# Patient Record
Sex: FEMALE | Race: WHITE | NOT HISPANIC OR LATINO | Employment: PART TIME | URBAN - METROPOLITAN AREA
[De-identification: names, ages, dates, MRNs, and addresses within clinical notes are randomized per-mention and may not be internally consistent; named-entity substitution may affect disease eponyms.]

---

## 2017-04-04 ENCOUNTER — TRANSCRIBE ORDERS (OUTPATIENT)
Dept: RADIOLOGY | Facility: HOSPITAL | Age: 32
End: 2017-04-04

## 2017-04-04 ENCOUNTER — HOSPITAL ENCOUNTER (OUTPATIENT)
Dept: RADIOLOGY | Facility: HOSPITAL | Age: 32
Discharge: HOME | End: 2017-04-04
Attending: INTERNAL MEDICINE
Payer: COMMERCIAL

## 2017-04-04 DIAGNOSIS — M79.7 RHEUMATISM, UNSPECIFIED AND FIBROSITIS: Primary | ICD-10-CM

## 2017-04-04 DIAGNOSIS — M79.0 RHEUMATISM, UNSPECIFIED AND FIBROSITIS: ICD-10-CM

## 2017-04-04 DIAGNOSIS — M79.7 RHEUMATISM, UNSPECIFIED AND FIBROSITIS: ICD-10-CM

## 2017-04-04 DIAGNOSIS — M79.0 RHEUMATISM, UNSPECIFIED AND FIBROSITIS: Primary | ICD-10-CM

## 2017-04-04 PROCEDURE — 73630 X-RAY EXAM OF FOOT: CPT

## 2018-07-25 RX ORDER — TRANEXAMIC ACID 650 1/1
TABLET ORAL
COMMUNITY
Start: 2018-05-15 | End: 2020-03-30 | Stop reason: ALTCHOICE

## 2018-07-27 ENCOUNTER — OFFICE VISIT (OUTPATIENT)
Dept: FAMILY MEDICINE CLINIC | Facility: CLINIC | Age: 33
End: 2018-07-27
Payer: COMMERCIAL

## 2018-07-27 VITALS
SYSTOLIC BLOOD PRESSURE: 118 MMHG | DIASTOLIC BLOOD PRESSURE: 78 MMHG | HEART RATE: 60 BPM | WEIGHT: 135 LBS | HEIGHT: 68 IN | BODY MASS INDEX: 20.46 KG/M2 | RESPIRATION RATE: 16 BRPM

## 2018-07-27 DIAGNOSIS — F41.9 ANXIETY: ICD-10-CM

## 2018-07-27 DIAGNOSIS — R53.83 FATIGUE, UNSPECIFIED TYPE: Primary | ICD-10-CM

## 2018-07-27 PROCEDURE — 99204 OFFICE O/P NEW MOD 45 MIN: CPT | Performed by: NURSE PRACTITIONER

## 2018-07-27 RX ORDER — BUSPIRONE HYDROCHLORIDE 15 MG/1
15 TABLET ORAL 3 TIMES DAILY
Qty: 60 TABLET | Refills: 5 | Status: SHIPPED | OUTPATIENT
Start: 2018-07-27 | End: 2018-09-17 | Stop reason: ALTCHOICE

## 2018-07-31 ENCOUNTER — TELEPHONE (OUTPATIENT)
Dept: FAMILY MEDICINE CLINIC | Facility: CLINIC | Age: 33
End: 2018-07-31

## 2018-07-31 NOTE — TELEPHONE ENCOUNTER
----- Message from Evelin Lazaro Dr sent at 7/31/2018  3:37 PM EDT -----  Call pt: all l abs are normal

## 2018-09-05 ENCOUNTER — TELEPHONE (OUTPATIENT)
Dept: FAMILY MEDICINE CLINIC | Facility: CLINIC | Age: 33
End: 2018-09-05

## 2018-09-05 NOTE — TELEPHONE ENCOUNTER
STAFF  ASSESSMENT IN YOUR FOLDER TO FILL OUT  she NEEDS A PPD AS WELL   SHE HAS NO INSURANCE, SO WE HAVE TO SEE WHAT THE NEW PRICING WILL BE,  CALL PATIENT WHEN FORM IS COMPLETE      [phone]

## 2018-09-17 ENCOUNTER — OFFICE VISIT (OUTPATIENT)
Dept: FAMILY MEDICINE CLINIC | Facility: CLINIC | Age: 33
End: 2018-09-17

## 2018-09-17 VITALS
HEART RATE: 84 BPM | DIASTOLIC BLOOD PRESSURE: 70 MMHG | SYSTOLIC BLOOD PRESSURE: 122 MMHG | BODY MASS INDEX: 20.72 KG/M2 | RESPIRATION RATE: 14 BRPM | WEIGHT: 136.7 LBS | HEIGHT: 68 IN

## 2018-09-17 DIAGNOSIS — Z00.00 PE (PHYSICAL EXAM), ANNUAL: Primary | ICD-10-CM

## 2018-09-17 DIAGNOSIS — Z11.1 SCREENING FOR TUBERCULOSIS: ICD-10-CM

## 2018-09-17 DIAGNOSIS — Z11.1 SCREENING EXAMINATION FOR PULMONARY TUBERCULOSIS: ICD-10-CM

## 2018-09-17 PROCEDURE — 99499 UNLISTED E&M SERVICE: CPT | Performed by: NURSE PRACTITIONER

## 2018-09-17 PROCEDURE — 86580 TB INTRADERMAL TEST: CPT

## 2018-09-17 NOTE — PROGRESS NOTES
Elzbieta Ramos is a 27 y o   female and is here for routine health maintenance  The patient reports no problems  Cancer Screening  Colononoscopy not indicated  Mammogram not indicated  Pap due in 2020  Abnormal pap? no        Immunization History   Administered Date(s) Administered    Td (adult), Unspecified 01/01/2001    Tdap 02/20/2014           Dentist Visit  within 6-12 months      The following portions of the patient's history were reviewed and updated as appropriate: allergies, current medications, past family history, past medical history, past social history, past surgical history and problem list       Review of Systems  Review of Systems   Constitutional: Negative  HENT: Negative  Eyes: Negative  Respiratory: Negative  Cardiovascular: Negative  Gastrointestinal: Negative  Endocrine: Negative  Genitourinary: Negative  Musculoskeletal: Negative  Skin: Negative  Allergic/Immunologic: Negative  Neurological: Negative  Hematological: Negative  Psychiatric/Behavioral: Negative  Objective   Vitals:    09/17/18 1318   BP: 122/70   Pulse: 84   Resp: 14     Wt Readings from Last 3 Encounters:   09/17/18 62 kg (136 lb 11 2 oz)   07/27/18 61 2 kg (135 lb)     BP Readings from Last 3 Encounters:   09/17/18 122/70   07/27/18 118/78     Pulse Readings from Last 3 Encounters:   09/17/18 84   07/27/18 60     BMI Readings from Last 3 Encounters:   09/17/18 21 09 kg/m²   07/27/18 20 83 kg/m²     Physical Exam   Constitutional: She is oriented to person, place, and time  She appears well-developed and well-nourished  HENT:   Head: Normocephalic  Eyes: Pupils are equal, round, and reactive to light  Neck: Normal range of motion  Neck supple  Cardiovascular: Normal rate and regular rhythm  Pulmonary/Chest: Effort normal and breath sounds normal    Abdominal: Soft  Bowel sounds are normal    Musculoskeletal: Normal range of motion     Neurological: She is alert and oriented to person, place, and time  Skin: Skin is warm  Psychiatric: She has a normal mood and affect  Her behavior is normal  Judgment and thought content normal        Assessment/Plan     Healthy female exam     1  PE: this was conducted and forms were completed  PPD applied and will RTo in 48-72 hours to be read  2  Patient Counseling:  --Nutrition: Stressed importance of moderation in sodium/caffeine intake, saturated fat and cholesterol, caloric balance, sufficient intake of fresh fruits, vegetables, fiber, calcium, iron  --Exercise: Stressed the importance of regular exercise  --Injury prevention: Discussed safety belts, safety helmets, smoke detector, smoking near bedding or upholstery  --Dental health: Discussed importance of regular tooth brushing, flossing, and dental visits  --Immunizations reviewed  --Discussed benefits of screening colonoscopy    --After hours service discussed with patient   rto prn

## 2018-09-19 LAB
INDURATION: 0 MM
TB SKIN TEST: NEGATIVE

## 2018-09-24 ENCOUNTER — HOSPITAL ENCOUNTER (EMERGENCY)
Facility: HOSPITAL | Age: 33
Discharge: HOME | End: 2018-09-24
Attending: EMERGENCY MEDICINE | Admitting: EMERGENCY MEDICINE

## 2018-09-24 ENCOUNTER — APPOINTMENT (EMERGENCY)
Dept: CT IMAGING | Facility: HOSPITAL | Age: 33
End: 2018-09-24

## 2018-09-24 ENCOUNTER — APPOINTMENT (EMERGENCY)
Dept: RADIOLOGY | Facility: HOSPITAL | Age: 33
End: 2018-09-24

## 2018-09-24 ENCOUNTER — TELEPHONE (OUTPATIENT)
Dept: FAMILY MEDICINE CLINIC | Facility: CLINIC | Age: 33
End: 2018-09-24

## 2018-09-24 VITALS
SYSTOLIC BLOOD PRESSURE: 133 MMHG | HEART RATE: 95 BPM | RESPIRATION RATE: 20 BRPM | BODY MASS INDEX: 20.6 KG/M2 | OXYGEN SATURATION: 99 % | WEIGHT: 131.25 LBS | HEIGHT: 67 IN | DIASTOLIC BLOOD PRESSURE: 71 MMHG | TEMPERATURE: 99.1 F

## 2018-09-24 DIAGNOSIS — R55 SYNCOPE: ICD-10-CM
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
## 2018-09-24 DIAGNOSIS — S06.0X9A CONCUSSION: Primary | ICD-10-CM

## 2018-09-24 LAB
ALBUMIN SERPL BCP-MCNC: 4.3 G/DL (ref 3.5–5)
ALP SERPL-CCNC: 73 U/L (ref 46–116)
ALT SERPL W P-5'-P-CCNC: 18 U/L (ref 12–78)
ANION GAP SERPL CALCULATED.3IONS-SCNC: 8 MMOL/L (ref 4–13)
AST SERPL W P-5'-P-CCNC: 12 U/L (ref 5–45)
BACTERIA UR QL AUTO: ABNORMAL /HPF
BASOPHILS # BLD AUTO: 0.02 THOUSANDS/ΜL (ref 0–0.1)
BASOPHILS NFR BLD AUTO: 0 % (ref 0–1)
BILIRUB SERPL-MCNC: 0.9 MG/DL (ref 0.2–1)
BILIRUB UR QL STRIP: NEGATIVE
BUN SERPL-MCNC: 20 MG/DL (ref 5–25)
CALCIUM SERPL-MCNC: 9.8 MG/DL (ref 8.3–10.1)
CHLORIDE SERPL-SCNC: 106 MMOL/L (ref 100–108)
CLARITY UR: ABNORMAL
CLARITY, POC: CLEAR
CO2 SERPL-SCNC: 28 MMOL/L (ref 21–32)
COLOR UR: YELLOW
COLOR, POC: YELLOW
CREAT SERPL-MCNC: 0.74 MG/DL (ref 0.6–1.3)
EOSINOPHIL # BLD AUTO: 0.08 THOUSAND/ΜL (ref 0–0.61)
EOSINOPHIL NFR BLD AUTO: 1 % (ref 0–6)
ERYTHROCYTE [DISTWIDTH] IN BLOOD BY AUTOMATED COUNT: 11.7 % (ref 11.6–15.1)
EXT BILIRUBIN, UA: NORMAL
EXT BLOOD URINE: NORMAL
EXT GLUCOSE, UA: NORMAL
EXT KETONES: 15
EXT NITRITE, UA: NORMAL
EXT PH, UA: 6
EXT PROTEIN, UA: NORMAL
EXT SPECIFIC GRAVITY, UA: 1.03
EXT UROBILINOGEN: 0.2
GFR SERPL CREATININE-BSD FRML MDRD: 106 ML/MIN/1.73SQ M
GLUCOSE SERPL-MCNC: 133 MG/DL (ref 65–140)
GLUCOSE UR STRIP-MCNC: ABNORMAL MG/DL
HCT VFR BLD AUTO: 43.5 % (ref 34.8–46.1)
HGB BLD-MCNC: 14.9 G/DL (ref 11.5–15.4)
HGB UR QL STRIP.AUTO: ABNORMAL
IMM GRANULOCYTES # BLD AUTO: 0.03 THOUSAND/UL (ref 0–0.2)
IMM GRANULOCYTES NFR BLD AUTO: 0 % (ref 0–2)
KETONES UR STRIP-MCNC: ABNORMAL MG/DL
LEUKOCYTE ESTERASE UR QL STRIP: ABNORMAL
LYMPHOCYTES # BLD AUTO: 1.42 THOUSANDS/ΜL (ref 0.6–4.47)
LYMPHOCYTES NFR BLD AUTO: 18 % (ref 14–44)
MCH RBC QN AUTO: 30.3 PG (ref 26.8–34.3)
MCHC RBC AUTO-ENTMCNC: 34.3 G/DL (ref 31.4–37.4)
MCV RBC AUTO: 88 FL (ref 82–98)
MONOCYTES # BLD AUTO: 0.43 THOUSAND/ΜL (ref 0.17–1.22)
MONOCYTES NFR BLD AUTO: 5 % (ref 4–12)
MUCOUS THREADS UR QL AUTO: ABNORMAL
NEUTROPHILS # BLD AUTO: 6.02 THOUSANDS/ΜL (ref 1.85–7.62)
NEUTS SEG NFR BLD AUTO: 76 % (ref 43–75)
NITRITE UR QL STRIP: NEGATIVE
NON-SQ EPI CELLS URNS QL MICRO: ABNORMAL /HPF
NRBC BLD AUTO-RTO: 0 /100 WBCS
PH UR STRIP.AUTO: 6 [PH] (ref 4.5–8)
PLATELET # BLD AUTO: 298 THOUSANDS/UL (ref 149–390)
PMV BLD AUTO: 8.9 FL (ref 8.9–12.7)
POTASSIUM SERPL-SCNC: 4.5 MMOL/L (ref 3.5–5.3)
PROT SERPL-MCNC: 7.9 G/DL (ref 6.4–8.2)
PROT UR STRIP-MCNC: NEGATIVE MG/DL
RBC # BLD AUTO: 4.92 MILLION/UL (ref 3.81–5.12)
RBC #/AREA URNS AUTO: ABNORMAL /HPF
SODIUM SERPL-SCNC: 142 MMOL/L (ref 136–145)
SP GR UR STRIP.AUTO: >=1.03 (ref 1–1.03)
TROPONIN I SERPL-MCNC: <0.02 NG/ML
UROBILINOGEN UR QL STRIP.AUTO: 0.2 E.U./DL
WBC # BLD AUTO: 8 THOUSAND/UL (ref 4.31–10.16)
WBC # BLD EST: NORMAL 10*3/UL
WBC #/AREA URNS AUTO: ABNORMAL /HPF

## 2018-09-24 PROCEDURE — 71046 X-RAY EXAM CHEST 2 VIEWS: CPT

## 2018-09-24 PROCEDURE — 80053 COMPREHEN METABOLIC PANEL: CPT | Performed by: PHYSICIAN ASSISTANT

## 2018-09-24 PROCEDURE — 73502 X-RAY EXAM HIP UNI 2-3 VIEWS: CPT

## 2018-09-24 PROCEDURE — 84484 ASSAY OF TROPONIN QUANT: CPT | Performed by: PHYSICIAN ASSISTANT

## 2018-09-24 PROCEDURE — 36415 COLL VENOUS BLD VENIPUNCTURE: CPT | Performed by: PHYSICIAN ASSISTANT

## 2018-09-24 PROCEDURE — 85025 COMPLETE CBC W/AUTO DIFF WBC: CPT | Performed by: PHYSICIAN ASSISTANT

## 2018-09-24 PROCEDURE — 93005 ELECTROCARDIOGRAM TRACING: CPT

## 2018-09-24 PROCEDURE — 70450 CT HEAD/BRAIN W/O DYE: CPT

## 2018-09-24 PROCEDURE — 87086 URINE CULTURE/COLONY COUNT: CPT | Performed by: PHYSICIAN ASSISTANT

## 2018-09-24 PROCEDURE — 99285 EMERGENCY DEPT VISIT HI MDM: CPT

## 2018-09-24 PROCEDURE — 81001 URINALYSIS AUTO W/SCOPE: CPT | Performed by: PHYSICIAN ASSISTANT

## 2018-09-24 NOTE — DISCHARGE INSTRUCTIONS
Rest, limit activity  No TV, computers, tablets for at least 1 week  Follow up with family doctor in 1 week for recheck  Tylenol for headaches  Concussion   WHAT YOU NEED TO KNOW:   A concussion is a mild brain injury  It is usually caused by a bump or blow to the head from a fall, a motor vehicle crash, or a sports injury  Sometimes being shaken forcefully may cause a concussion  DISCHARGE INSTRUCTIONS:   Have someone else call 911 for the following:   · Someone tries to wake you and cannot do so  · You have a seizure, increasing confusion, or a change in personality  · Your speech becomes slurred, or you have new vision problems  Return to the emergency department if:   · You have a severe headache that does not go away  · You have arm or leg weakness, numbness, or new problems with coordination  · You have blood or clear fluid coming out of the ears or nose  Contact your healthcare provider if:   · You have nausea or are vomiting  · You feel more sleepy than usual     · Your symptoms get worse  · Your symptoms last longer than 6 weeks after the injury  · You have questions or concerns about your condition or care  Medicines:   · Acetaminophen  helps to decrease pain  It is available without a doctor's order  Ask how much to take and how often to take it  Follow directions  Acetaminophen can cause liver damage if not taken correctly  · NSAIDs , such as ibuprofen, help decrease swelling and pain  NSAIDs can cause stomach bleeding or kidney problems in certain people  If you take blood thinner medicine, always ask your healthcare provider if NSAIDs are safe for you  Always read the medicine label and follow directions  · Take your medicine as directed  Contact your healthcare provider if you think your medicine is not helping or if you have side effects  Tell him or her if you are allergic to any medicine  Keep a list of the medicines, vitamins, and herbs you take  Include the amounts, and when and why you take them  Bring the list or the pill bottles to follow-up visits  Carry your medicine list with you in case of an emergency  Follow up with your healthcare provider as directed:  Write down your questions so you remember to ask them during your visits  Self-care:   · Rest  from physical and mental activities as directed  Mental activities are those that require thinking, concentration, and attention  You will need to rest until your symptoms are gone  Rest will allow you to recover from your concussion  Ask your healthcare provider when you can return to work and other daily activities  · Have someone stay with you for the first 24 hours after your injury  Your healthcare provider should be contacted if your symptoms get worse, or you develop new symptoms  · Do not participate in sports and physical activities until your healthcare provider says it is okay  They could make your symptoms worse or lead to another concussion  Your healthcare provider will tell you when it is okay for you to return to sports or physical activities  Prevent another concussion:   · Wear protective sports equipment that fit properly  Helmets help decrease your risk of a serious brain injury  Talk to your healthcare provider about ways you can decrease your risk for a concussion if you play sports  · Wear your seat belt  every time you travel  This helps to decrease your risk of a head injury if you are in a car accident  © 2017 [employer]  Information is for End User's use only and may not be sold, redistributed or otherwise used for commercial purposes  All illustrations and images included in CareNotes® are the copyrighted property of A [employer]  or [employer]  The above information is an  only  It is not intended as medical advice for individual conditions or treatments   Talk to your doctor, nurse or pharmacist before following any medical regimen to see if it is safe and effective for you  Syncope   AMBULATORY CARE:   Syncope  is also called fainting or passing out  Syncope is a sudden, temporary loss of consciousness, followed by a fall from a standing or sitting position  Syncope ranges from not serious to a sign of a more serious condition that needs to be treated  You can control some health conditions that cause syncope  Your healthcare providers can help you create a plan to manage syncope and prevent episodes  Signs and symptoms that may occur before syncope include the following:   · Cold, clammy, and sweaty skin     · Fast breathing and a racing, pounding heartbeat     · Feeling more tired than usual     · Nausea, a warm feeling, and sweating    · A headache, or feeling lightheaded or dizzy    · Tingling sensation or numbness     · Spots in front of your eyes, blurred vision, or double vision  Seek care immediately if:   · You are bleeding because you hit your head when you fainted  · You suddenly have double vision, difficulty speaking, numbness, and cannot move your arms or legs  · You have chest pain and trouble breathing  · You vomit blood or material that looks like coffee grounds  · You see blood in your bowel movement  Contact your healthcare provider if:   · You have new or worsening symptoms  · You have another syncope episode  · You have a headache, fast heartbeat, or feel too dizzy to stand up  · You have questions or concerns about your condition or care  Treatment for syncope  depends on the cause of your syncope  To prevent syncope from happening again, you may  need any of the following:  · Medicines  may be needed to treat any medical conditions that are causing your syncope  These may include medicines to help your heart pump strongly and regularly  Your healthcare provider may also make changes to any medicines that are causing syncope       · Tilt training  involves training yourself to stand for 10 to 30 minutes each day against a wall  This helps your body decrease the effects of posture changes and reduces the number of fainting spells  Manage syncope:   · Keep a record of your syncope episodes  Include your symptoms and your activity before and after the episode  The record can help your healthcare provider find the cause of your syncope and help you manage episodes  · Sit or lie down when needed  This includes when you feel dizzy, your throat is getting tight, and your vision changes  Raise your legs above the level of your heart  · Take slow, deep breaths if you start to breathe faster with anxiety or fear  This can help decrease dizziness and the feeling that you might faint  · Check your blood pressure often  This is important if you take medicine to lower your blood pressure  Check your blood pressure when you are lying down and when you are standing  Ask how often to check during the day  Keep a record of your blood pressure numbers  Your healthcare provider may use the record to help plan your treatment  Prevent a syncope episode:   · Move slowly and let yourself get used to one position before you move to another position  This is very important when you change from a lying or sitting position to a standing position  Take some deep breaths before you stand up from a lying position  Stand up slowly  Sudden movements may cause a fainting spell  Sit on the side of the bed or couch for a few minutes before you stand up  If you are on bedrest, try to be upright for about 2 hours each day, or as directed  Do not lock your legs if you are standing for a long period of time  Move your legs and bend your knees to keep blood flowing  · Follow your healthcare provider's recommendations  Your provider may  recommend that you drink more liquids to prevent dehydration   You may also need to have more salt to keep your blood pressure from dropping too low and causing syncope  Your provider will tell you how much liquid and sodium to have each day  · Watch for signs of low blood sugar  These include hunger, nervousness, sweating, and fast or fluttery heartbeats  Talk with your healthcare provider about ways to keep your blood sugar level steady  · Do not strain if you are constipated  You may faint if you strain to have a bowel movement  Walking is the best way to get your bowels moving  Eat foods high in fiber to make it easier to have a bowel movement  Good examples are high-fiber cereals, beans, vegetables, and whole-grain breads  Prune juice may help make bowel movements softer  · Be careful in hot weather  Heat can cause a syncope episode  Limit activity done outside on hot days  Physical activity in hot weather can lead to dehydration  This can cause an episode  Follow up with your healthcare provider as directed:  Write down your questions so you remember to ask them during your visits  © 2017 [facility] Information is for End User's use only and may not be sold, redistributed or otherwise used for commercial purposes  All illustrations and images included in CareNotes® are the copyrighted property of [employer]  or Martín Kong  The above information is an  only  It is not intended as medical advice for individual conditions or treatments  Talk to your doctor, nurse or pharmacist before following any medical regimen to see if it is safe and effective for you

## 2018-09-24 NOTE — ED NOTES
Patient aware of the need for a urine sample  Patient unable to provide urine sample at this time   Resting comfortably with  at bed side      Issac Geronimo RN  09/24/18 9077

## 2018-09-24 NOTE — ED PROVIDER NOTES
History  Chief Complaint   Patient presents with    Syncope     Patient states she has two episodes of syncope last week  Patient thinks she may have a concussion, old ecchymotic area on her L shin  Patient states she slept all weekend and has a headache, nausea, and can't focus     Patient is a 26 y/o F that was brought to the ED by  for syncope x 2 last week   states patient fell 1 week ago down the steps  SHe states she does remember that she slipped coming down the steps, but doesn't remember what happened after that  He also states patient's uncle told him that she fell 4 days ago  He states he heard a thump, but patient was able to get up on her own  She states she does not remember falling, but knows she has a concussion because she has been sleeping all weekend, has a headache and nausea  SHe has bruising to left lower leg and left hip pain, no other injuries  Patient states she has a history of Fibromyalgia and when she takes a hot shower sometimes she feels weak and she thinks that is what happened last week  She was not started on any new medications  History provided by:  Patient and spouse  Syncope   Episode history:  Multiple  Most recent episode:  More than 2 days ago  Progression:  Unchanged  Chronicity:  New  Context: standing up    Witnessed: no    Relieved by:  Lying down  Ineffective treatments:  None tried  Associated symptoms: headaches, nausea and recent fall    Associated symptoms: no chest pain, no confusion, no diaphoresis, no difficulty breathing, no dizziness, no fever, no focal sensory loss, no rectal bleeding, no seizures, no shortness of breath, no vomiting and no weakness    Risk factors: no congenital heart disease, no coronary artery disease, no seizures and no vascular disease        Prior to Admission Medications   Prescriptions Last Dose Informant Patient Reported? Taking?    Tranexamic Acid 650 MG TABS   Yes No   Sig: Two tabs po tid for up to five days with menses      Facility-Administered Medications: None       Past Medical History:   Diagnosis Date    Anxiety     Depression     Fibromyalgia        Past Surgical History:   Procedure Laterality Date    TUBAL LIGATION         Family History   Problem Relation Age of Onset    Depression Father     Alcohol abuse Neg Hx     Substance Abuse Neg Hx      I have reviewed and agree with the history as documented  Social History   Substance Use Topics    Smoking status: Never Smoker    Smokeless tobacco: Never Used    Alcohol use Yes        Review of Systems   Constitutional: Negative for diaphoresis and fever  HENT: Negative  Eyes: Negative for visual disturbance  Respiratory: Negative for cough and shortness of breath  Cardiovascular: Positive for syncope  Negative for chest pain and leg swelling  Gastrointestinal: Positive for nausea  Negative for abdominal pain, constipation, diarrhea and vomiting  Musculoskeletal: Negative for back pain and neck pain  Skin: Positive for wound (abrasion left leg)  Negative for color change  Neurological: Positive for syncope and headaches  Negative for dizziness, tremors, seizures, speech difficulty, weakness, light-headedness and numbness  Psychiatric/Behavioral: Negative for confusion  All other systems reviewed and are negative  Physical Exam  Physical Exam   Constitutional: She is oriented to person, place, and time  She appears well-developed and well-nourished  She is cooperative  She does not appear ill  No distress  HENT:   Head: Normocephalic and atraumatic  Right Ear: Hearing and tympanic membrane normal    Left Ear: Hearing and tympanic membrane normal    Nose: Nose normal    Mouth/Throat: Mucous membranes are not pale and dry  Eyes: Conjunctivae and EOM are normal  Pupils are equal, round, and reactive to light  Neck: Normal range of motion  Cardiovascular: Regular rhythm and normal heart sounds    Tachycardia present  No murmur heard  Pulmonary/Chest: Effort normal and breath sounds normal  She has no wheezes  She has no rhonchi  She has no rales  Abdominal: Soft  Normal appearance and bowel sounds are normal  There is no tenderness  Musculoskeletal: Normal range of motion  She exhibits no edema or deformity  Left hip: She exhibits tenderness  She exhibits normal range of motion, normal strength, no bony tenderness, no swelling and no deformity  Neurological: She is alert and oriented to person, place, and time  She has normal strength  No cranial nerve deficit or sensory deficit  Gait normal  GCS eye subscore is 4  GCS verbal subscore is 5  GCS motor subscore is 6  Skin: Skin is warm and dry  Abrasion (left lower leg  ) and bruising (left lower leg  ) noted  No rash noted  She is not diaphoretic  No pallor  Psychiatric: Her mood appears anxious  Nursing note and vitals reviewed        Vital Signs  ED Triage Vitals [09/24/18 1341]   Temperature Pulse Respirations Blood Pressure SpO2   99 1 °F (37 3 °C) (!) 110 20 136/73 98 %      Temp src Heart Rate Source Patient Position - Orthostatic VS BP Location FiO2 (%)   -- -- -- -- --      Pain Score       5           Vitals:    09/24/18 1415 09/24/18 1430 09/24/18 1445 09/24/18 1500   BP: 121/78 129/72 121/68 133/71   Pulse:  95 97 95       Visual Acuity  Visual Acuity      Most Recent Value   L Pupil Size (mm)  4   R Pupil Size (mm)  4          ED Medications  Medications - No data to display    Diagnostic Studies  Results Reviewed     Procedure Component Value Units Date/Time    UA w Reflex to Microscopic w Reflex to Culture [[id number]] Collected:  09/24/18 1503    Lab Status:  No result Specimen:  Urine from Urine, Clean Catch     POCT urinalysis dipstick [[id number]]  (Normal) Resulted:  09/24/18 1450    Lab Status:  Final result Specimen:  Urine Updated:  09/24/18 1455     Color, UA YELLOW     Clarity, UA CLEAR     EXT Glucose, UA (Ref: Negative) NEG EXT Bilirubin, UA (Ref: Negative) NEG     Ketones, UA (Ref: Negative) 15     EXT Spec Grav, UA (Ref:1 003-1 030) 1 030     Blood, UA (Ref: Negative) LARGE     EXT pH, UA (Ref: 4 5-8 0) 6 0     EXT Protein, UA (Ref: Negative) TRACE     Urobilinogen, UA (Ref: 0 2- 1 0) 0 2      Leukocytes, UA (Ref: Negative) TRACE     Nitrite, UA (Ref: Negative) NEG    Troponin I [[id number]]  (Normal) Collected:  09/24/18 1407    Lab Status:  Final result Specimen:  Blood from Arm, Left Updated:  09/24/18 1450     Troponin I <0 02 ng/mL     Comprehensive metabolic panel [[id number]] Collected:  09/24/18 1407    Lab Status:  Final result Specimen:  Blood from Arm, Left Updated:  09/24/18 1448     Sodium 142 mmol/L      Potassium 4 5 mmol/L      Chloride 106 mmol/L      CO2 28 mmol/L      ANION GAP 8 mmol/L      BUN 20 mg/dL      Creatinine 0 74 mg/dL      Glucose 133 mg/dL      Calcium 9 8 mg/dL      AST 12 U/L      ALT 18 U/L      Alkaline Phosphatase 73 U/L      Total Protein 7 9 g/dL      Albumin 4 3 g/dL      Total Bilirubin 0 90 mg/dL      eGFR 106 ml/min/1 73sq m     Narrative:         National Kidney Disease Education Program recommendations are as follows:  GFR calculation is accurate only with a steady state creatinine  Chronic Kidney disease less than 60 ml/min/1 73 sq  meters  Kidney failure less than 15 ml/min/1 73 sq  meters      CBC and differential [[id number]]  (Abnormal) Collected:  09/24/18 1407    Lab Status:  Final result Specimen:  Blood from Arm, Left Updated:  09/24/18 1428     WBC 8 00 Thousand/uL      RBC 4 92 Million/uL      Hemoglobin 14 9 g/dL      Hematocrit 43 5 %      MCV 88 fL      MCH 30 3 pg      MCHC 34 3 g/dL      RDW 11 7 %      MPV 8 9 fL      Platelets 319 Thousands/uL      nRBC 0 /100 WBCs      Neutrophils Relative 76 (H) %      Immat GRANS % 0 %      Lymphocytes Relative 18 %      Monocytes Relative 5 %      Eosinophils Relative 1 %      Basophils Relative 0 %      Neutrophils Absolute 6 02 Thousands/µL      Immature Grans Absolute 0 03 Thousand/uL      Lymphocytes Absolute 1 42 Thousands/µL      Monocytes Absolute 0 43 Thousand/µL      Eosinophils Absolute 0 08 Thousand/µL      Basophils Absolute 0 02 Thousands/µL                  CT head without contrast   Final Result by Vanessa Olivo MD (09/24 1434)      No acute intracranial abnormality  Workstation performed: [id number]         XR chest 2 views   Final Result by Brittaney Becerra MD (09/24 1431)   Lower thoracic dextroscoliosis      No acute cardiopulmonary disease  Workstation performed: [id number]         XR hip/pelv 2-3 vws right   Final Result by Brittaney Becerra MD (09/24 1430)      No acute osseous abnormality  Workstation performed: [id number]                    Procedures  ECG 12 Lead Documentation  Date/Time: 9/24/2018 1:50 PM  Performed by: Diamante Bullard by: Earney Heimlich     Indications / Diagnosis:  Syncope  Patient location:  ED  Previous ECG:     Previous ECG:  Unavailable  Rate:     ECG rate:  97  Rhythm:     Rhythm: sinus rhythm    QRS:     QRS axis:  Normal  Conduction:     Conduction: normal    ST segments:     ST segments:  Normal  T waves:     T waves: normal             Phone Contacts  ED Phone Contact    ED Course                               MDM  Number of Diagnoses or Management Options  Concussion: new and requires workup  Fall: new and requires workup  Syncope: new and requires workup  Diagnosis management comments: Patient with headache and fatigue and nausea after a fall/syncope, will order CT head to r/o brain  Hemorrhage  Patient has history of concussion and states this feels similar  Patient given brain rest instructions and advised to f/u with PCP before returning to work           Amount and/or Complexity of Data Reviewed  Clinical lab tests: ordered and reviewed  Tests in the radiology section of CPT®: ordered and reviewed  Obtain history from someone other than the patient: yes ()    Patient Progress  Patient progress: stable    CritCare Time    Disposition  Final diagnoses:   Concussion   Syncope   Fall     Time reflects when diagnosis was documented in both MDM as applicable and the Disposition within this note     Time User Action Codes Description Comment    9/24/2018  3:05 PM Yaneth Ache Add [S06 [id number]] Concussion     9/24/2018  3:05 PM Yaneth Ache Add [R55] Syncope     9/24/2018  3:05 PM Yaneth Ache Add [L86  XXXA] Fall       ED Disposition     ED Disposition Condition Comment    Discharge  [facility] discharge to home/self care  Condition at discharge: Stable        Follow-up Information     Follow up With Specialties Details Why Contact Info    Chely Almaraz, [address] [facility], Nurse Practitioner In 1 week For recheck [employer] [address]  [phone]            Patient's Medications   Discharge Prescriptions    No medications on file     No discharge procedures on file      ED Provider  Electronically Signed by           Hair eFlton PA-C  09/24/18 3996

## 2018-09-24 NOTE — TELEPHONE ENCOUNTER
Patient fell down the stairs thurs or fri and woke up this am with no memory of the weekend  Per jose delgado patient should go immediately to the er to be evaluated

## 2018-09-25 LAB
ATRIAL RATE: 97 BPM
BACTERIA UR CULT: NORMAL
P AXIS: 81 DEGREES
PR INTERVAL: 140 MS
QRS AXIS: 79 DEGREES
QRSD INTERVAL: 74 MS
QT INTERVAL: 350 MS
QTC INTERVAL: 444 MS
T WAVE AXIS: 59 DEGREES
VENTRICULAR RATE: 97 BPM

## 2018-09-25 PROCEDURE — 93010 ELECTROCARDIOGRAM REPORT: CPT | Performed by: INTERNAL MEDICINE

## 2018-09-26 ENCOUNTER — TELEPHONE (OUTPATIENT)
Dept: FAMILY MEDICINE CLINIC | Facility: CLINIC | Age: 33
End: 2018-09-26

## 2018-09-26 NOTE — TELEPHONE ENCOUNTER
The patient was in the er the other day with a concussion, they said she may also have a uti and ran cultures  They were going to call her but never did, they emailed her instead  She does not understand the results   Can you please let her know      [phone]

## 2018-09-26 NOTE — TELEPHONE ENCOUNTER
Her culture did not grow enough bacteria to be consider a UTI, there for she does not need treatment  Is she around the time of her period? That is what the urine seems more consistent with

## 2018-09-28 ENCOUNTER — OFFICE VISIT (OUTPATIENT)
Dept: FAMILY MEDICINE CLINIC | Facility: CLINIC | Age: 33
End: 2018-09-28

## 2018-09-28 ENCOUNTER — TELEPHONE (OUTPATIENT)
Dept: FAMILY MEDICINE CLINIC | Facility: CLINIC | Age: 33
End: 2018-09-28

## 2018-09-28 VITALS
HEART RATE: 92 BPM | SYSTOLIC BLOOD PRESSURE: 124 MMHG | DIASTOLIC BLOOD PRESSURE: 70 MMHG | RESPIRATION RATE: 12 BRPM | WEIGHT: 134 LBS | BODY MASS INDEX: 21.03 KG/M2 | HEIGHT: 67 IN

## 2018-09-28 DIAGNOSIS — S06.0X9A CONCUSSION WITH LOSS OF CONSCIOUSNESS, INITIAL ENCOUNTER: Primary | ICD-10-CM

## 2018-09-28 PROCEDURE — 99214 OFFICE O/P EST MOD 30 MIN: CPT | Performed by: NURSE PRACTITIONER

## 2018-09-28 NOTE — PATIENT INSTRUCTIONS
1  Concussion with loss of consciousness, initial encounter  Please avoid driving  No " blue screens " ie TV, computers and cell phones until evaluated  Please follow up with the concussion specialist

## 2018-09-28 NOTE — PROGRESS NOTES
Chief Complaint   Patient presents with    Head Injury     Assessment/Plan:      1  Concussion with loss of consciousness, initial encounter  Please avoid driving  No " blue screens " ie TV, computers and cell phones until evaluated  Please follow up with the concussion specialist        - Ambulatory referral to Orthopedic Surgery; Future      Subjective:      Patient ID: Al Garcia is a 27 y o  female  Here today s/p er visit  "sometime " last week maybe 9/19/2018 was having a "flare of my fibro" and took a warm shower  As she was walking down the stairs her knees gave out and she fell  Recalls this fall and believes that she hit her R leg and her R hip and L hip and elbow as she has some resolving bruises   Believes that she may have hit her head as she but does not recall getting up and states she does not recall anything until the 9/23  Is told by her family that  She was  sleeping all the time , was shaky and "appeared like death " during the days that she does not recall  Family did not seek any medical attention  States that she has had concussions in the past and on 9/23 her  was concerned as she was to be starting a new job the next day (9/24) do on 9/25 called here and was directed to the er  Went to the er [facility]  There she has CT , xrays of the hip and chest and labs and everything was normal  Since then has been having periods of confusion, forgetfulness and dizziness  States that has hadconcussions in the  past   10 years ago was the first: Head hit the was when she was in bed  Did not loose consciousness  Did not seek medical attention  2nd one was when she was on the [employer] [place] [id number] and she was walking up stairs and rome hit her on the head  Did not loose consciousness  PCP told her it sounded like a concussion                   The following portions of the patient's history were reviewed and updated as appropriate: allergies, current medications, past family history, past medical history, past social history, past surgical history and problem list     Past Medical History:   Diagnosis Date    Anxiety     Depression     Fibromyalgia      Past Surgical History:   Procedure Laterality Date    TUBAL LIGATION       Family History   Problem Relation Age of Onset    Depression Father     Alcohol abuse Neg Hx     Substance Abuse Neg Hx      Social History   Social History     Social History    Marital status: /Civil Union     Spouse name: N/A    Number of children: N/A    Years of education: N/A     Occupational History    Not on file  Social History Main Topics    Smoking status: Never Smoker    Smokeless tobacco: Never Used    Alcohol use Yes    Drug use: No    Sexual activity: Yes     Partners: Male     Other Topics Concern    Not on file     Social History Narrative    No narrative on file     Review of Systems   Constitutional: Positive for activity change  HENT: Negative  Eyes: Negative  Respiratory: Negative  Cardiovascular: Negative  Neurological: Positive for dizziness  Psychiatric/Behavioral: Positive for confusion  Vitals:    09/28/18 0903   BP: 124/70   BP Location: Left arm   Patient Position: Sitting   Pulse: 92   Resp: 12   Weight: 60 8 kg (134 lb)   Height: 5' 7" (1 702 m)       Objective:   Wt Readings from Last 3 Encounters:   09/28/18 60 8 kg (134 lb)   09/24/18 59 5 kg (131 lb 4 oz)   09/17/18 62 kg (136 lb 11 2 oz)     BP Readings from Last 3 Encounters:   09/28/18 124/70   09/24/18 133/71   09/17/18 122/70     Pulse Readings from Last 3 Encounters:   09/28/18 92   09/24/18 95   09/17/18 84     BMI Readings from Last 3 Encounters:   09/28/18 20 99 kg/m²   09/24/18 20 56 kg/m²   09/17/18 21 09 kg/m²     Admission on 09/24/2018, Discharged on 09/24/2018   Component Date Value Ref Range Status    WBC 09/24/2018 8 00  4 31 - 10 16 Thousand/uL Final    RBC 09/24/2018 4 92  3 81 - 5 12 Million/uL Final    Hemoglobin 09/24/2018 14 9  11 5 - 15 4 g/dL Final    Hematocrit 09/24/2018 43 5  34 8 - 46 1 % Final    MCV 09/24/2018 88  82 - 98 fL Final    MCH 09/24/2018 30 3  26 8 - 34 3 pg Final    MCHC 09/24/2018 34 3  31 4 - 37 4 g/dL Final    RDW 09/24/2018 11 7  11 6 - 15 1 % Final    MPV 09/24/2018 8 9  8 9 - 12 7 fL Final    Platelets 30/60/9722 298  149 - 390 Thousands/uL Final    nRBC 09/24/2018 0  /100 WBCs Final    Neutrophils Relative 09/24/2018 76* 43 - 75 % Final    Immat GRANS % 09/24/2018 0  0 - 2 % Final    Lymphocytes Relative 09/24/2018 18  14 - 44 % Final    Monocytes Relative 09/24/2018 5  4 - 12 % Final    Eosinophils Relative 09/24/2018 1  0 - 6 % Final    Basophils Relative 09/24/2018 0  0 - 1 % Final    Neutrophils Absolute 09/24/2018 6 02  1 85 - 7 62 Thousands/µL Final    Immature Grans Absolute 09/24/2018 0 03  0 00 - 0 20 Thousand/uL Final    Lymphocytes Absolute 09/24/2018 1 42  0 60 - 4 47 Thousands/µL Final    Monocytes Absolute 09/24/2018 0 43  0 17 - 1 22 Thousand/µL Final    Eosinophils Absolute 09/24/2018 0 08  0 00 - 0 61 Thousand/µL Final    Basophils Absolute 09/24/2018 0 02  0 00 - 0 10 Thousands/µL Final    Sodium 09/24/2018 142  136 - 145 mmol/L Final    Potassium 09/24/2018 4 5  3 5 - 5 3 mmol/L Final    Chloride 09/24/2018 106  100 - 108 mmol/L Final    CO2 09/24/2018 28  21 - 32 mmol/L Final    ANION GAP 09/24/2018 8  4 - 13 mmol/L Final    BUN 09/24/2018 20  5 - 25 mg/dL Final    Creatinine 09/24/2018 0 74  0 60 - 1 30 mg/dL Final    Standardized to IDMS reference method    Glucose 09/24/2018 133  65 - 140 mg/dL Final      If the patient is fasting, the ADA then defines impaired fasting glucose as > 100 mg/dL and diabetes as > or equal to 123 mg/dL  Specimen collection should occur prior to Sulfasalazine administration due to the potential for falsely depressed results   Specimen collection should occur prior to Sulfapyridine administration due to the potential for falsely elevated results   Calcium 09/24/2018 9 8  8 3 - 10 1 mg/dL Final    AST 09/24/2018 12  5 - 45 U/L Final      Specimen collection should occur prior to Sulfasalazine administration due to the potential for falsely depressed results   ALT 09/24/2018 18  12 - 78 U/L Final      Specimen collection should occur prior to Sulfasalazine administration due to the potential for falsely depressed results   Alkaline Phosphatase 09/24/2018 73  46 - 116 U/L Final    Total Protein 09/24/2018 7 9  6 4 - 8 2 g/dL Final    Albumin 09/24/2018 4 3  3 5 - 5 0 g/dL Final    Total Bilirubin 09/24/2018 0 90  0 20 - 1 00 mg/dL Final    eGFR 09/24/2018 106  ml/min/1 73sq m Final    Color, UA 09/24/2018 YELLOW   Final    Clarity, UA 09/24/2018 CLEAR   Final    EXT Glucose, UA (Ref: Negative) 09/24/2018 NEG   Final    EXT Bilirubin, UA (Ref: Negative) 09/24/2018 NEG   Final    Ketones, UA (Ref: Negative) 09/24/2018 15   Final    EXT Spec Grav, UA (Ref:1 003-1 030) 09/24/2018 1 030   Final    Blood, UA (Ref: Negative) 09/24/2018 LARGE   Final    EXT pH, UA (Ref: 4 5-8 0) 09/24/2018 6 0   Final    EXT Protein, UA (Ref: Negative) 09/24/2018 TRACE   Final    Urobilinogen, UA (Ref: 0 2- 1 0) 09/24/2018 0 2   Final     Leukocytes, UA (Ref: Negative) 09/24/2018 TRACE   Final    Nitrite, UA (Ref: Negative) 09/24/2018 NEG   Final    Troponin I 09/24/2018 <0 02  <=0 04 ng/mL Final    3Autovalidation override  [employer] Chemistry analyzer 99% cutoff is > 0 04 ng/mL in network labs     o cTnI 99% cutoff is useful only when applied to patients in the clinical setting of myocardial ischemia   o cTnI 99% cutoff should be interpreted in the context of clinical history, ECG findings and possibly cardiac imaging to establish correct diagnosis  o cTnI 99% cutoff may be suggestive but clearly not indicative of a coronary event without the clinical setting of myocardial ischemia        Color, UA 09/24/2018 Yellow   Final  Clarity, UA 09/24/2018 Slightly Cloudy   Final    Specific Gravity, UA 09/24/2018 >=1 030  1 003 - 1 030 Final    pH, UA 09/24/2018 6 0  4 5 - 8 0 Final    Leukocytes, UA 09/24/2018 Small* Negative Final    Nitrite, UA 09/24/2018 Negative  Negative Final    Protein, UA 09/24/2018 Negative  Negative mg/dl Final    Glucose, UA 09/24/2018 100 (1/10%)* Negative mg/dl Final    Ketones, UA 09/24/2018 15 (1+)* Negative mg/dl Final    Urobilinogen, UA 09/24/2018 0 2  0 2, 1 0 E U /dl E U /dl Final    Bilirubin, UA 09/24/2018 Negative  Negative Final    Blood, UA 09/24/2018 Moderate* Negative Final    RBC, UA 09/24/2018 20-30* None Seen, 0-5 /hpf Final    WBC, UA 09/24/2018 10-20* None Seen, 0-5, 5-55, 5-65 /hpf Final    Epithelial Cells 09/24/2018 Moderate* None Seen, Occasional /hpf Final    Bacteria, UA 09/24/2018 Moderate* None Seen, Occasional /hpf Final    MUCOUS THREADS 09/24/2018 Moderate* None Seen Final    Urine Culture 09/24/2018 10,000-19,000 cfu/ml    Final    Mixed Contaminants X2    Ventricular Rate 09/24/2018 97  BPM Final    Atrial Rate 09/24/2018 97  BPM Final    SD Interval 09/24/2018 140  ms Final    QRSD Interval 09/24/2018 74  ms Final    QT Interval 09/24/2018 350  ms Final    QTC Interval 09/24/2018 444  ms Final    P Axis 09/24/2018 81  degrees Final    QRS Axis 09/24/2018 79  degrees Final    T Wave [place] 09/24/2018 59  degrees Final   Office Visit on 09/17/2018   Component Date Value Ref Range Status    TB Skin Test 09/19/2018 Negative   Final    Induration 09/19/2018 0  mm Final          Physical Exam   Constitutional: She is oriented to person, place, and time  She appears well-developed and well-nourished  HENT:   Head: Normocephalic and atraumatic  Eyes: Pupils are equal, round, and reactive to light  Conjunctivae and EOM are normal    Neck: Normal range of motion  Neck supple  Cardiovascular: Normal rate, regular rhythm and normal heart sounds  Musculoskeletal: Normal range of motion  She exhibits no edema  Neurological: She is alert and oriented to person, place, and time  She has normal reflexes  No cranial nerve deficit  Coordination normal    Skin:   Resolving bruises on her L shin  B/L hips and R elbow   Psychiatric: She has a normal mood and affect   Her behavior is normal  Judgment and thought content normal    Slow speech which is her norm;

## 2018-09-28 NOTE — TELEPHONE ENCOUNTER
Ina Burkitt called (mother)  She said her daughter may have fallen THREE times and is very concerned as she does not look well and has no recollection of what happened  She said she fell once Tuesday, September 18 and twice (down the steps) Thursday, September 20  She went to [facility], where she was diagnosed with a concussion  She was unable to come to her daughter's appointment this morning  I told her that we were not allowed to discuss her daughter with her (only spouse allowed) but she said she just wanted you to know and is very worried about her daughter's confusion and lack of memory  Is there anything you can add to her daughter's care at this point?

## 2018-10-03 ENCOUNTER — OFFICE VISIT (OUTPATIENT)
Dept: OBGYN CLINIC | Facility: CLINIC | Age: 33
End: 2018-10-03
Payer: COMMERCIAL

## 2018-10-03 VITALS
HEART RATE: 89 BPM | SYSTOLIC BLOOD PRESSURE: 127 MMHG | HEIGHT: 67 IN | BODY MASS INDEX: 21.22 KG/M2 | WEIGHT: 135.2 LBS | DIASTOLIC BLOOD PRESSURE: 81 MMHG

## 2018-10-03 DIAGNOSIS — S06.0X9A CONCUSSION WITH LOSS OF CONSCIOUSNESS, INITIAL ENCOUNTER: ICD-10-CM

## 2018-10-03 DIAGNOSIS — H51.11 CONVERGENCE INSUFFICIENCY: ICD-10-CM

## 2018-10-03 DIAGNOSIS — R55 SYNCOPE DUE TO AUTONOMIC FAILURE: ICD-10-CM

## 2018-10-03 DIAGNOSIS — G44.311 INTRACTABLE ACUTE POST-TRAUMATIC HEADACHE: ICD-10-CM

## 2018-10-03 DIAGNOSIS — S09.90XA HEAD TRAUMA, INITIAL ENCOUNTER: ICD-10-CM

## 2018-10-03 DIAGNOSIS — S06.0X0A CONCUSSION WITHOUT LOSS OF CONSCIOUSNESS, INITIAL ENCOUNTER: Primary | ICD-10-CM

## 2018-10-03 PROCEDURE — 99205 OFFICE O/P NEW HI 60 MIN: CPT | Performed by: FAMILY MEDICINE

## 2018-10-03 NOTE — PROGRESS NOTES
Assessment:     1  Concussion without loss of consciousness, initial encounter    2  Concussion with loss of consciousness, initial encounter    3  Intractable acute post-traumatic headache    4  Convergence insufficiency    5  Head trauma, initial encounter    6  Syncope due to autonomic failure        Plan:     Problem List Items Addressed This Visit     Head trauma, initial encounter    Relevant Orders    MRI brain wo contrast    Concussion with no loss of consciousness - Primary    Relevant Orders    MRI brain wo contrast    Intractable acute post-traumatic headache    Relevant Orders    MRI brain wo contrast    Convergence insufficiency    Relevant Orders    MRI brain wo contrast    Syncope due to autonomic failure      Other Visit Diagnoses     Concussion with loss of consciousness, initial encounter             Subjective:     Patient ID: Malinda Choe is a 27 y o  female  Chief Complaint:  Patient is a 52-year-old female presenting today for evaluation of head injuries following multiple falls  She states that she sustained a fall on September 20, 2018 while walking down the stairs after taking a shower  She reports some onset of dizziness prior to the fall and does not remember the events thereafter  She was seen in the ER and a CT of the head of at that time was negative for any intracranial hemorrhages  Since that time she reports repeated falls at her home  She tells me that she lives with her mother, brother and uncle  She also reports abnormal behaviors as witnessed by her family members including eating stake sauce in her bedroom while not recalling bringing food and states also into her bedroom  She does report a history of 3 concussions with her 2nd concussion occurring approximately 10 years ago  During that time, she reports having her feet tickled and hitting the back of her head on a hard surface wall    At this time, she reports ongoing daily headaches of moderate to severe intensity with associated dizziness  She also reports difficulties with maintaining her balance  She does ambulate at this time with a cane which she states that she does use when her fibromyalgia becomes worse  She also reports sensitivities to both light and noise today  From a cognitive standpoint, she reports increased feelings of foggy this, slow down and difficulty with concentration remembering  She does report she has been sleeping less and has difficulty falling asleep  She does not recall any changes of her medications, as she states that she does not use any medications other than 1 pill to regulate her periods  Concussion symptom score today is 16 of 22  Allergy:  No Known Allergies  Medications:  all current active meds have been reviewed  Past Medical History:  Past Medical History:   Diagnosis Date    Anxiety     Depression     Fibromyalgia      Past Surgical History:  Past Surgical History:   Procedure Laterality Date    TUBAL LIGATION       Family History:  Family History   Problem Relation Age of Onset    Depression Father     Alcohol abuse Neg Hx     Substance Abuse Neg Hx      Social History:  History   Alcohol Use    Yes     History   Drug Use No     History   Smoking Status    Never Smoker   Smokeless Tobacco    Never Used     Review of Systems   HENT: Negative  Eyes: Positive for photophobia  Respiratory: Negative  Cardiovascular: Negative  Gastrointestinal: Negative  Genitourinary: Negative  Musculoskeletal: Negative  Skin: Negative  Allergic/Immunologic: Negative  Neurological: Positive for dizziness, speech difficulty, weakness and headaches  Hematological: Negative  Psychiatric/Behavioral: Positive for confusion, decreased concentration and sleep disturbance           Objective:  BP Readings from Last 1 Encounters:   10/03/18 127/81      Wt Readings from Last 1 Encounters:   10/03/18 61 3 kg (135 lb 3 2 oz)      BMI:   Estimated body mass index is 21 18 kg/m² as calculated from the following:    Height as of this encounter: 5' 7" (1 702 m)  Weight as of this encounter: 61 3 kg (135 lb 3 2 oz)  BSA:   Estimated body surface area is 1 71 meters squared as calculated from the following:    Height as of this encounter: 5' 7" (1 702 m)  Weight as of this encounter: 61 3 kg (135 lb 3 2 oz)  Physical Exam   Constitutional: She is oriented to person, place, and time  Vital signs are normal  She appears well-developed  HENT:   Head: Normocephalic  Eyes: Pupils are equal, round, and reactive to light  Pulmonary/Chest: Effort normal    Musculoskeletal: Normal range of motion  Neurological: She is alert and oriented to person, place, and time  EOMI: In tact  Horizontal nystagmus:  None  Vertical nystagmus:  None  Accommodations: 20 cm  Convergence: 24 cm  Single leg stance eyes open: Abnormal   Single leg stance eyes closed: Abnormal   Heel-toe walk for/backwards eyes open: Abnormal   Heel-toe walk for/backwards eyes closed: Abnormal    Skin: Skin is warm and dry  Psychiatric: She has a normal mood and affect  Nursing note and vitals reviewed  Ortho Exam    I have personally reviewed pertinent films in PACS  CT of the head on September 24, 2018 demonstrates no acute intracranial abnormalities  Total time of encounter is 40 minutes with greater than 50% of time devoted to education and counseling

## 2018-10-09 ENCOUNTER — HOSPITAL ENCOUNTER (OUTPATIENT)
Dept: MRI IMAGING | Facility: HOSPITAL | Age: 33
Discharge: HOME | End: 2018-10-09
Attending: FAMILY MEDICINE

## 2018-10-09 DIAGNOSIS — S06.0X0A CONCUSSION WITHOUT LOSS OF CONSCIOUSNESS, INITIAL ENCOUNTER: ICD-10-CM

## 2018-10-09 DIAGNOSIS — H51.11 CONVERGENCE INSUFFICIENCY: ICD-10-CM

## 2018-10-09 DIAGNOSIS — S09.90XA HEAD TRAUMA, INITIAL ENCOUNTER: ICD-10-CM

## 2018-10-09 DIAGNOSIS — G44.311 INTRACTABLE ACUTE POST-TRAUMATIC HEADACHE: ICD-10-CM

## 2018-10-12 ENCOUNTER — HOSPITAL ENCOUNTER (EMERGENCY)
Facility: HOSPITAL | Age: 33
Discharge: HOME | End: 2018-10-12
Attending: EMERGENCY MEDICINE
Payer: COMMERCIAL

## 2018-10-12 VITALS
HEART RATE: 100 BPM | SYSTOLIC BLOOD PRESSURE: 132 MMHG | OXYGEN SATURATION: 98 % | WEIGHT: 131 LBS | BODY MASS INDEX: 20.56 KG/M2 | HEIGHT: 67 IN | RESPIRATION RATE: 18 BRPM | DIASTOLIC BLOOD PRESSURE: 77 MMHG

## 2018-10-12 DIAGNOSIS — S71.112A LACERATION OF LEFT THIGH: Primary | ICD-10-CM

## 2018-10-12 PROCEDURE — 99283 EMERGENCY DEPT VISIT LOW MDM: CPT

## 2018-10-12 RX ORDER — GINSENG 100 MG
1 CAPSULE ORAL ONCE
Status: COMPLETED | OUTPATIENT
Start: 2018-10-12 | End: 2018-10-12

## 2018-10-12 RX ORDER — LIDOCAINE HYDROCHLORIDE 10 MG/ML
5 INJECTION, SOLUTION EPIDURAL; INFILTRATION; INTRACAUDAL; PERINEURAL ONCE
Status: COMPLETED | OUTPATIENT
Start: 2018-10-12 | End: 2018-10-12

## 2018-10-12 RX ADMIN — LIDOCAINE HYDROCHLORIDE 5 ML: 10 INJECTION, SOLUTION EPIDURAL; INFILTRATION; INTRACAUDAL; PERINEURAL at 15:18

## 2018-10-12 RX ADMIN — BACITRACIN ZINC 1 SMALL APPLICATION: 500 OINTMENT TOPICAL at 15:19

## 2018-10-12 NOTE — ED PROCEDURE NOTE
PROCEDURE  Lac Repair  Date/Time: 10/12/2018 3:46 PM  Performed by: Yasmin Smallwood  Authorized by: Yasmin Smallwood   Body area: lower extremity  Location details: left upper leg  Laceration length: 3 cm  Tendon involvement: none  Nerve involvement: none  Vascular damage: no  Anesthesia: local infiltration    Anesthesia:  Local Anesthetic: lidocaine 1% without epinephrine  Anesthetic total: 5 mL    Wound Dehiscence:  Superficial Wound Dehiscence: simple closure      Procedure Details:  Irrigation solution: saline  Skin closure: 5-0 nylon  Number of sutures: 7  Technique: running  Dressing: antibiotic ointment and 4x4 sterile gauze  Patient tolerance: Patient tolerated the procedure well with no immediate complications           Riki Delong DO  10/12/18 1657

## 2018-10-12 NOTE — DISCHARGE INSTRUCTIONS
Care For Your Stitches   WHAT YOU NEED TO KNOW:   Stitches, or sutures, are used to close cuts and wounds on the skin  Stitches need to be removed after your wound has healed  DISCHARGE INSTRUCTIONS:   Return to the emergency department if:   · Your stitches come apart  · Blood soaks through your bandages  · You suddenly cannot move your injured joint  · You have sudden numbness around your wound  · You see red streaks coming from your wound  Contact your healthcare provider if:   · You have a fever and chills  · Your wound is red, warm, swollen, or leaking pus  · There is a bad smell coming from your wound  · You have increased pain in the wound area  · You have questions or concerns about your condition or care  Care for your stitches:  Keep your stitches clean and dry  You may need to cover your stitches with a bandage for 24 to 48 hours, or as directed  Do not bump or hit the suture area, as this could open the wound  Do not trim or shorten the ends of your stitches  If they rub on your clothing, put a gauze bandage between the stitches and your clothes  Clean your wound:  Carefully wash your wound with soap and water  For mouth and lip wounds, rinse your mouth after meals and at bedtime  Ask your healthcare provider what to use to rinse your mouth  If you have a scalp wound, you may gently wash your hair every 2 days with mild shampoo  Do not use hair products, such as hair spray  Help your wound heal:   · Elevate  your wound above the level of your heart as often as you can  This will help decrease swelling and pain  Prop your wound on pillows or blankets to keep it elevated comfortably  · Limit activity  Do not stretch the skin around your wound  This will help prevent bleeding and swelling of the wound area  Follow up with your healthcare provider as directed: You may need to return to have your stitches removed   Write down your questions so you remember to ask them during your visits  © 2017 [employer] Information is for End User's use only and may not be sold, redistributed or otherwise used for commercial purposes  All illustrations and images included in CareNotes® are the copyrighted property of [employer]  or [employer]  The above information is an  only  It is not intended as medical advice for individual conditions or treatments  Talk to your doctor, nurse or pharmacist before following any medical regimen to see if it is safe and effective for you

## 2018-10-12 NOTE — ED PROVIDER NOTES
History  Chief Complaint   Patient presents with    Laceration     To L anterior/lateral thigh just above knee, bleeding controlled with simple bandage pta - patient was taking out a trash bag that had broken glass from a picture frame when it stuck through the bag and cut her  ~3cm horizontal laceration     3 cm laceration to the left thigh that happened approximately 1 hour ago on a broken piece of glass that was sticking out of a trash bag last tetanus is within 5 years no other areas of injury she is ambulatory without any problems        History provided by:  Patient  Injury   Location:  Left thigh  Quality:  Laceration  Severity:  Moderate  Onset quality:  Sudden  Duration:  1 hour  Timing:  Constant  Progression:  Unchanged  Chronicity:  New  Context:  Cut on a broken piece of glass      Prior to Admission Medications   Prescriptions Last Dose Informant Patient Reported? Taking? Tranexamic Acid 650 MG TABS   Yes No   Sig: Two tabs po tid for up to five days with menses      Facility-Administered Medications: None       Past Medical History:   Diagnosis Date    Anxiety     Concussion     recent trauma, improving s/s    Depression     Fibromyalgia        Past Surgical History:   Procedure Laterality Date    TUBAL LIGATION         Family History   Problem Relation Age of Onset    Depression Father     Alcohol abuse Neg Hx     Substance Abuse Neg Hx      I have reviewed and agree with the history as documented  Social History   Substance Use Topics    Smoking status: Never Smoker    Smokeless tobacco: Never Used    Alcohol use Yes        Review of Systems   Skin: Positive for wound  All other systems reviewed and are negative  Physical Exam  Physical Exam   Constitutional: She is oriented to person, place, and time  She appears well-developed and well-nourished  No distress  HENT:   Head: Normocephalic and atraumatic     Nose: Nose normal    Mouth/Throat: Oropharynx is clear and moist  Eyes: Pupils are equal, round, and reactive to light  EOM are normal    Neck: Neck supple  No tracheal deviation present  Cardiovascular: Normal rate, regular rhythm and intact distal pulses  Exam reveals no gallop and no friction rub  No murmur heard  Pulmonary/Chest: Effort normal and breath sounds normal  No stridor  Abdominal: Soft  Bowel sounds are normal  She exhibits no distension  There is no tenderness  Musculoskeletal: She exhibits tenderness  She exhibits no edema  Neurological: She is alert and oriented to person, place, and time  No cranial nerve deficit  Skin: She is not diaphoretic  3 cm laceration lateral aspect left thigh no acute bleeding drainage sign of infection or neurovascular deficit  Tendons are intact and patient is ambulatory   Psychiatric: She has a normal mood and affect  Her behavior is normal  Thought content normal    Nursing note and vitals reviewed        Vital Signs  ED Triage Vitals [10/12/18 1507]   Temp Pulse Respirations Blood Pressure SpO2   -- 100 18 132/77 98 %      Temp Source Heart Rate Source Patient Position - Orthostatic VS BP Location FiO2 (%)   Oral Monitor Sitting Left arm --      Pain Score       4           Vitals:    10/12/18 1507   BP: 132/77   Pulse: 100   Patient Position - Orthostatic VS: Sitting       Visual Acuity  Visual Acuity      Most Recent Value   L Pupil Size (mm)  3   R Pupil Size (mm)  3          ED Medications  Medications   lidocaine (PF) (XYLOCAINE-MPF) 1 % injection 5 mL (5 mL Infiltration Given 10/12/18 1518)   bacitracin topical ointment 1 small application (1 small application Topical Given 10/12/18 1519)       Diagnostic Studies  Results Reviewed     None                 No orders to display              Procedures  Procedures       Phone Contacts  ED Phone Contact    ED Course                               [facility]  CritCare Time    Disposition  Final diagnoses:   Laceration of left thigh     Time reflects when diagnosis was documented in both MDM as applicable and the Disposition within this note     Time User Action Codes Description Comment    10/12/2018  3:19 PM Dominique Duarte Add [J83 912H] Laceration of left thigh       ED Disposition     ED Disposition Condition Comment    Discharge  [facility] discharge to home/self care  Condition at discharge: Stable        Follow-up Information     Follow up With Specialties Details Why Contact Info    Marycruz Reinoso, [address], Nurse Practitioner In 1 week For suture removal [address] [place] [address]  [phone]            Discharge Medication List as of 10/12/2018  3:20 PM      CONTINUE these medications which have NOT CHANGED    Details   Tranexamic Acid 650 MG TABS Two tabs po tid for up to five days with menses, Historical Med           No discharge procedures on file      ED Provider  Electronically Signed by           Shira Anderson,   10/12/18 Chelsie Ricardo 131, DO  10/12/18 Chelsie Ricardo 131, DO  10/12/18 6188

## 2018-10-15 ENCOUNTER — VBI (OUTPATIENT)
Dept: ADMINISTRATIVE | Facility: OTHER | Age: 33
End: 2018-10-15

## 2018-10-15 NOTE — TELEPHONE ENCOUNTER
[facility]    ED Visit Information     Ed visit date: 10/12/18  Diagnosis Description: Laceration of left thigh  In Network? Yes Meriam Carrel  Discharge status: Home  Discharged with meds ? NA  Number of ED visits to date: 1  ED Severity:4     Outreach Information    Outreach successful: Yes 1  Date letter mailed:0  Date Finalized:10/15/18    Care Coordination    Follow up appointment with pcp: no declined  Transportation issues ? No    [facility] [employer] type:  3 Day Outreach  Emergent necessity warranted by diagnosis:  Yes  [facility] PCP:  Yes  Transportation:  Self Transport  Called PCP first?:  No  Feels able to call PCP for urgent problems ?:  Yes  Understands what emergencies can be handled by PCP ?:  Yes  Ever any problems getting appointment with PCP for minor emergency/urgency problems?:  No  Practice Contacted Patient ?:  No  Pt had ED follow up with pcp/staff ?:  No    Seen for follow-up out of network ?:  No  Reason Patient went to ED instead of Urgent Care or PCP?:  Perceived Severity of Illness  Urgent care Education?:  Yes  I spoke with [facility] today she stated she is doing better the wound is healing well, Pt declined PCP follow up  We did talk about going to urgent care to have the stitches removed  Pt stated she understands and will take that in consideration

## 2018-10-22 ENCOUNTER — TELEPHONE (OUTPATIENT)
Dept: OBGYN CLINIC | Facility: CLINIC | Age: 33
End: 2018-10-22

## 2019-08-21 NOTE — PROGRESS NOTES
Chief Complaint   Patient presents with    fibromylasia     Pt also concern w depression, Medication work but make race     Assessment/Plan:    1  Fatigue, unspecified type  We will check labs and will call you with the results    - TSH, 3rd generation with Free T4 reflex; Future  - Comprehensive metabolic panel; Future  - CBC and differential; Future    2  Anxiety  Please start the  buspar  We reviewed the gradual increase taking this am and pm  Please increase once a week until you are feeling good  We will see you back in 6 weeks  - busPIRone (BUSPAR) 15 mg tablet; Take 1 tablet (15 mg total) by mouth 3 (three) times a day  Dispense: 60 tablet; Refill: 5   rto 6 weeks  Subjective:      Patient ID: Paty Ramos is a 27 y o  female  Here today as  A new patient  States that she does not have a  Current doctor and was last  seen in 2015  Has fibromyalgia  Was dx with this when she was 23 but symptoms go back to age 14  Had been seeing [facility] for treatment  ( family pracitce) and also follows with Dr Zac Thacker for rheum  Did not tolerated the treatments  ( last saw him about one year ago)  Is here today to see if we can help with her fibromyalgia, depression and anxiety  Currently is not on any medications  Has tried gabapentin  Cymbalta, tramadol, pain management ( they rx'd the tramadol) and others that she cannot recall  Has been with out meds for about 1 1/2 years  Has been trying to manage this with supplement ( mag, fish oil) with no success  Also struggles with depression and anxiety and this is getting worse   Wants to get that under control  Feels like a big knot in her stomach with the anxiety  Cannot sleep due to inability to " turn off her brain" Has cut down on coffee as something to help  ( has tried xanax for this in the past  Felt that this worked)  Was taking that about once every other day  Had tried Lexapro in the past    Feels pain from head to toe and fatigue   Loss of [facility] Care Coordination Contact    CHW, contacted member for follow up on renewing her ID. CHW asked member if she knew when her current ID  and she indicated she was not sure.  CHW, explained expiration would determine what forms to complete. Member would look for her existing ID and CHW ,would follow up on .       EASTON Escalante  [facility]  [phone]    appetite, depression,  Anxiety, headaches  The following portions of the patient's history were reviewed and updated as appropriate: allergies, current medications, past family history, past medical history, past social history, past surgical history and problem list     Past Medical History:   Diagnosis Date    Anxiety     Depression     Fibromyalgia      Past Surgical History:   Procedure Laterality Date    TUBAL LIGATION       History reviewed  No pertinent family history  Social History   Social History     Social History    Marital status: /Civil Union     Spouse name: N/A    Number of children: N/A    Years of education: N/A     Occupational History    Not on file  Social History Main Topics    Smoking status: Never Smoker    Smokeless tobacco: Never Used    Alcohol use Yes    Drug use: No    Sexual activity: Yes     Partners: Male     Other Topics Concern    Not on file     Social History Narrative    No narrative on file     Review of Systems   Constitutional: Positive for appetite change  HENT: Negative  Eyes: Negative  Respiratory: Negative  Cardiovascular: Negative  Gastrointestinal: Positive for constipation and nausea  Genitourinary: Negative  Musculoskeletal: Positive for arthralgias and myalgias  Skin: Negative  Neurological: Positive for headaches  Negative for dizziness and syncope  Psychiatric/Behavioral: Positive for agitation  Negative for sleep disturbance  The patient is nervous/anxious  Vitals:    07/27/18 1130   BP: 118/78   Pulse: 60   Resp: 16   Weight: 61 2 kg (135 lb)   Height: 5' 7 5" (1 715 m)       Objective: Wt Readings from Last 3 Encounters:   07/27/18 61 2 kg (135 lb)     BP Readings from Last 3 Encounters:   07/27/18 118/78     Pulse Readings from Last 3 Encounters:   07/27/18 60     BMI Readings from Last 3 Encounters:   07/27/18 20 83 kg/m²     No visits with results within 2 Year(s) from this visit     Latest known visit with results is:   No results found for any previous visit  Physical Exam   Constitutional: She is oriented to person, place, and time  She appears well-developed and well-nourished  HENT:   Nose: Nose normal    Mouth/Throat: Oropharynx is clear and moist    Neck: Normal range of motion  Neck supple  Cardiovascular: Normal rate, regular rhythm and normal heart sounds  Pulmonary/Chest: Effort normal and breath sounds normal  No respiratory distress  She has no wheezes  Abdominal: Soft  Bowel sounds are normal    Neurological: She is alert and oriented to person, place, and time  Skin: Skin is warm and dry  Psychiatric:   Good eye contact  Fair insight and judgment  Fair perspective

## 2019-09-03 ENCOUNTER — TELEPHONE (OUTPATIENT)
Dept: FAMILY MEDICINE CLINIC | Facility: CLINIC | Age: 34
End: 2019-09-03

## 2019-09-03 NOTE — TELEPHONE ENCOUNTER
Patient had a pe 09-17-18  She will be starting a new job and needs a form completed and a ppd  Do you want patient to make a physical appointment to complete the form since it is almost a year or can we just schedule the ppd and you will complete the form

## 2019-09-04 ENCOUNTER — CLINICAL SUPPORT (OUTPATIENT)
Dept: FAMILY MEDICINE CLINIC | Facility: CLINIC | Age: 34
End: 2019-09-04
Payer: COMMERCIAL

## 2019-09-04 DIAGNOSIS — Z23 NEED FOR TUBERCULOSIS VACCINATION: Primary | ICD-10-CM

## 2019-09-04 PROCEDURE — 86580 TB INTRADERMAL TEST: CPT

## 2019-09-06 ENCOUNTER — CLINICAL SUPPORT (OUTPATIENT)
Dept: FAMILY MEDICINE CLINIC | Facility: CLINIC | Age: 34
End: 2019-09-06

## 2019-09-06 ENCOUNTER — TELEPHONE (OUTPATIENT)
Dept: FAMILY MEDICINE CLINIC | Facility: CLINIC | Age: 34
End: 2019-09-06

## 2019-09-06 DIAGNOSIS — Z11.1 ENCOUNTER FOR PPD SKIN TEST READING: Primary | ICD-10-CM

## 2019-09-06 LAB
INDURATION: 0 MM
TB SKIN TEST: NEGATIVE

## 2019-09-06 NOTE — TELEPHONE ENCOUNTER
Pt  Te aVsquez in today for a PPD read and for her form to be filled out  I read her PPD and gave her the form  Per Rima Can she did not need a physical she would just sign the paper work

## 2019-10-04 ENCOUNTER — HOSPITAL ENCOUNTER (EMERGENCY)
Facility: HOSPITAL | Age: 34
Discharge: HOME | End: 2019-10-04
Attending: EMERGENCY MEDICINE | Admitting: EMERGENCY MEDICINE
Payer: COMMERCIAL

## 2019-10-04 VITALS
TEMPERATURE: 97.8 F | RESPIRATION RATE: 18 BRPM | HEART RATE: 90 BPM | OXYGEN SATURATION: 100 % | BODY MASS INDEX: 20.51 KG/M2 | WEIGHT: 130.95 LBS | DIASTOLIC BLOOD PRESSURE: 72 MMHG | SYSTOLIC BLOOD PRESSURE: 127 MMHG

## 2019-10-04 DIAGNOSIS — L60.0 INGROWN TOENAIL: Primary | ICD-10-CM

## 2019-10-04 PROCEDURE — 99282 EMERGENCY DEPT VISIT SF MDM: CPT | Performed by: EMERGENCY MEDICINE

## 2019-10-04 PROCEDURE — 99283 EMERGENCY DEPT VISIT LOW MDM: CPT

## 2019-10-04 PROCEDURE — 11730 AVULSION NAIL PLATE SIMPLE 1: CPT | Performed by: EMERGENCY MEDICINE

## 2019-10-04 RX ORDER — BUPIVACAINE HYDROCHLORIDE 5 MG/ML
20 INJECTION, SOLUTION EPIDURAL; INTRACAUDAL ONCE
Status: COMPLETED | OUTPATIENT
Start: 2019-10-04 | End: 2019-10-04

## 2019-10-04 RX ADMIN — BUPIVACAINE HYDROCHLORIDE 20 ML: 5 INJECTION, SOLUTION EPIDURAL; INTRACAUDAL; PERINEURAL at 14:59

## 2019-10-05 NOTE — ED PROVIDER NOTES
History  Chief Complaint   Patient presents with    Toe Pain     To ED with c/o infected ingrown toe nail on the right x 3 days  Patient complains of infected painful right first toe ingrown toenail started yesterday with swelling, purulent discharge, tissue popping out from underneath nail, no injury did not take any medicine for this  Prior to Admission Medications   Prescriptions Last Dose Informant Patient Reported? Taking? Tranexamic Acid 650 MG TABS   Yes No   Sig: Two tabs po tid for up to five days with menses      Facility-Administered Medications: None       Past Medical History:   Diagnosis Date    Anxiety     Concussion     recent trauma, improving s/s    Depression     Fibromyalgia        Past Surgical History:   Procedure Laterality Date    TUBAL LIGATION         Family History   Problem Relation Age of Onset    Depression Father     Alcohol abuse Neg Hx     Substance Abuse Neg Hx      I have reviewed and agree with the history as documented  Social History     Tobacco Use    Smoking status: Never Smoker    Smokeless tobacco: Never Used   Substance Use Topics    Alcohol use: Yes     Comment: social    Drug use: No        Review of Systems   All other systems reviewed and are negative  Physical Exam  Physical Exam   Constitutional: She appears well-developed and well-nourished  HENT:   Mouth/Throat: Oropharynx is clear and moist    Eyes: Pupils are equal, round, and reactive to light  Conjunctivae and EOM are normal    Neck: Normal range of motion  Neck supple  No spinous process tenderness present  Cardiovascular: Normal rate, regular rhythm, normal heart sounds and intact distal pulses  Pulmonary/Chest: Effort normal and breath sounds normal  No respiratory distress  She has no wheezes  Abdominal: Soft  Bowel sounds are normal  She exhibits no distension  There is no tenderness  Musculoskeletal: Normal range of motion          Right foot: There is tenderness  Feet:    Neurological: She is alert  She has normal strength  No sensory deficit  GCS eye subscore is 4  GCS verbal subscore is 5  GCS motor subscore is 6  Skin: Skin is warm and dry  No rash noted  Psychiatric: She has a normal mood and affect  Nursing note and vitals reviewed  Vital Signs  ED Triage Vitals [10/04/19 1202]   Temperature Pulse Respirations Blood Pressure SpO2   97 8 °F (36 6 °C) 90 18 127/72 100 %      Temp Source Heart Rate Source Patient Position - Orthostatic VS BP Location FiO2 (%)   Tympanic Monitor Sitting Right arm --      Pain Score       Worst Possible Pain           Vitals:    10/04/19 1202   BP: 127/72   Pulse: 90   Patient Position - Orthostatic VS: Sitting         Visual Acuity      ED Medications  Medications   bupivacaine (PF) (MARCAINE) 0 5 % injection 20 mL (20 mL Infiltration Given 10/4/19 1648)       Diagnostic Studies  Results Reviewed     None                 No orders to display              Procedures  Incision and drain  Date/Time: 10/4/2019 5:32 PM  Performed by: Franchesca Grubbs DO  Authorized by: Franchesca Grubbs DO     Patient location:  ED  Consent:     Consent obtained:  Verbal    Consent given by:  Patient    Risks discussed:  Bleeding, incomplete drainage and infection    Alternatives discussed:  No treatment  Universal protocol:     Procedure explained and questions answered to patient or proxy's satisfaction: yes      Patient identity confirmed:  Verbally with patient  Location:     Indications for incision and drainage: right first toe ingrown toenail  Location:  Lower extremity    Lower extremity location:  R big toe  Pre-procedure details:     Skin preparation:  Betadine  Anesthesia (see MAR for exact dosages):      Anesthesia method:  Nerve block    Block location:  Right first toe    Block needle gauge:  27 G    Block anesthetic:  Bupivacaine 0 5% w/o epi    Block injection procedure:  Anatomic landmarks identified, introduced needle, incremental injection and negative aspiration for blood    Block outcome:  Anesthesia achieved  Procedure details:     Complexity:  Simple    Incision types:  Single straight    Approach:  Open    Incision depth:  Subungual    Wound management:  Probed and deloculated, irrigated with saline, extensive cleaning and debrided    Drainage:  Purulent    Drainage amount:  Scant    Wound treatment:  Wound left open    Packing materials:  None  Post-procedure details:     Patient tolerance of procedure: Tolerated well, no immediate complications  Comments: This procedure was incision and excision of ingrown toenail right big toe - the medial border was excised to the base  ED Course                               MDM  Number of Diagnoses or Management Options  Ingrown toenail: new and does not require workup  Patient Progress  Patient progress: improved      Disposition  Final diagnoses:   Ingrown toenail - ACUTE RIGHT FIRST TOE WITH INFECTION     Time reflects when diagnosis was documented in both MDM as applicable and the Disposition within this note     Time User Action Codes Description Comment    10/4/2019  3:26 PM Wilfrid Sawyer Add [L60 0] Ingrown toenail     10/4/2019  3:26 PM Wilfrid Sawyer Modify [L60 0] Ingrown toenail ACUTE RIGHT FIRST TOE WITH INFECTION      ED Disposition     ED Disposition Condition Date/Time Comment    Discharge Stable Fri Oct 4, 2019  3:26 PM [facility] discharge to home/self care  Follow-up Information    None         Discharge Medication List as of 10/4/2019  3:26 PM      CONTINUE these medications which have NOT CHANGED    Details   Tranexamic Acid 650 MG TABS Two tabs po tid for up to five days with menses, Historical Med           No discharge procedures on file      ED Provider  Electronically Signed by     Olinda Meneses DO  10/10/19 5721

## 2020-03-30 ENCOUNTER — TELEMEDICINE (OUTPATIENT)
Dept: FAMILY MEDICINE CLINIC | Facility: CLINIC | Age: 35
End: 2020-03-30
Payer: COMMERCIAL

## 2020-03-30 VITALS — HEART RATE: 97 BPM

## 2020-03-30 DIAGNOSIS — F51.04 PSYCHOPHYSIOLOGICAL INSOMNIA: Primary | ICD-10-CM

## 2020-03-30 DIAGNOSIS — G47.00 INSOMNIA, UNSPECIFIED TYPE: ICD-10-CM

## 2020-03-30 DIAGNOSIS — F41.1 ANXIETY STATE: ICD-10-CM

## 2020-03-30 PROCEDURE — 99212 OFFICE O/P EST SF 10 MIN: CPT | Performed by: PHYSICIAN ASSISTANT

## 2020-03-30 RX ORDER — HYDROXYZINE HYDROCHLORIDE 25 MG/1
25 TABLET, FILM COATED ORAL
Qty: 20 TABLET | Refills: 0 | Status: SHIPPED | OUTPATIENT
Start: 2020-03-30 | End: 2021-10-12 | Stop reason: ALTCHOICE

## 2020-03-30 NOTE — PROGRESS NOTES
Virtual Regular Visit       Reason for visit is insomnia, anxiety    Encounter provider Bushra hCandra PA-C    Provider located at [address] [place] [address] [address] [facility]  [phone]      Recent Visits  No visits were found meeting these conditions  Showing recent visits within past 7 days and meeting all other requirements     Today's Visits  Date Type Provider Dept   03/30/20 Telemedicine Bushra Chandra PA-C [address] [facility]   Showing today's visits and meeting all other requirements     Future Appointments  Date Type Provider Dept   03/30/20 Telemedicine Bushra Chandra PA-C [address] [facility]   Showing future appointments within next 150 days and meeting all other requirements        The patient was identified by name and date of birth  Zackary Mae was informed that this is a telemedicine visit and that the visit is being conducted through [employer] and patient was informed that this is not a secure, HIPAA-complaint platform  she agrees to proceed     My office door was closed  No one else was in the room  She acknowledged consent and understanding of privacy and security of the video platform  The patient has agreed to participate and understands they can discontinue the visit at any time  Patient is aware this is a billable service  Trevon Emerson is a 37 y o  female who has been suffering from anxiety and insomnia for the past few weeks  Patients mind is constantly running, has a knot in her stomach due to worry  Cannot shut her mind off and spends the night up worrying  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 he has a learning disability and they almost had a IEP in place then the schools closed  She worries about how he will fall behind and how far without the classes and IEP   She is listening to soothing music, taking walks, nothing seems to help  Has a history of anxiety, seen therapist in the past  Not recently  Requesting something just for sleep at night, feels she will be able to manage better if she can sleep  Past Medical History:   Diagnosis Date    Anxiety     Concussion     recent trauma, improving s/s    Depression     Fibromyalgia        Past Surgical History:   Procedure Laterality Date    HYSTERECTOMY  02/21/2020    TUBAL LIGATION         No current outpatient medications on file  No current facility-administered medications for this visit  Allergies   Allergen Reactions    Bee Venom Hives    Amoxicillin Hives and Itching    Chlorine Itching    Pollen Extract Sneezing     Seasonal       Review of Systems    Physical Exam   Constitutional: She is oriented to person, place, and time  She appears well-developed and well-nourished  Pulmonary/Chest: No respiratory distress  Neurological: She is alert and oriented to person, place, and time  Psychiatric: Her behavior is normal  Judgment and thought content normal    Anxious, concerned      Assessment/Plan:    1  Anxiety, situational - will try hydroxyzine at night as needed, encouraged healthy sleep wake routine, exercise, mediatation  Encouraged her to reach out to her therapist for virtual session  Follow up in 2 weeks if needed or sooner as needed    I spent 15 minutes with the patient during this visit

## 2020-06-16 ENCOUNTER — TELEPHONE (OUTPATIENT)
Dept: FAMILY MEDICINE CLINIC | Facility: CLINIC | Age: 35
End: 2020-06-16

## 2020-06-18 ENCOUNTER — OFFICE VISIT (OUTPATIENT)
Dept: FAMILY MEDICINE CLINIC | Facility: CLINIC | Age: 35
End: 2020-06-18
Payer: COMMERCIAL

## 2020-06-18 VITALS
TEMPERATURE: 98 F | WEIGHT: 153.4 LBS | RESPIRATION RATE: 16 BRPM | BODY MASS INDEX: 22.72 KG/M2 | HEART RATE: 98 BPM | DIASTOLIC BLOOD PRESSURE: 70 MMHG | SYSTOLIC BLOOD PRESSURE: 112 MMHG | HEIGHT: 69 IN

## 2020-06-18 DIAGNOSIS — M79.7 FIBROMYALGIA: Primary | ICD-10-CM

## 2020-06-18 DIAGNOSIS — F41.1 ANXIETY STATE: ICD-10-CM

## 2020-06-18 PROCEDURE — 99214 OFFICE O/P EST MOD 30 MIN: CPT | Performed by: PHYSICIAN ASSISTANT

## 2020-06-18 PROCEDURE — 1036F TOBACCO NON-USER: CPT | Performed by: PHYSICIAN ASSISTANT

## 2020-06-18 RX ORDER — ESCITALOPRAM OXALATE 10 MG/1
10 TABLET ORAL DAILY
Qty: 90 TABLET | Refills: 1 | Status: SHIPPED | OUTPATIENT
Start: 2020-06-18 | End: 2020-08-05 | Stop reason: SDUPTHER

## 2020-08-05 ENCOUNTER — TELEPHONE (OUTPATIENT)
Dept: FAMILY MEDICINE CLINIC | Facility: CLINIC | Age: 35
End: 2020-08-05

## 2020-08-05 DIAGNOSIS — F41.1 ANXIETY STATE: ICD-10-CM

## 2020-08-05 DIAGNOSIS — M79.7 FIBROMYALGIA: ICD-10-CM

## 2020-08-05 RX ORDER — ESCITALOPRAM OXALATE 20 MG/1
20 TABLET ORAL DAILY
Qty: 30 TABLET | Refills: 0 | Status: SHIPPED | OUTPATIENT
Start: 2020-08-05 | End: 2020-09-08

## 2020-08-05 NOTE — TELEPHONE ENCOUNTER
Pt called stating she knows she has an appt with you on Monday but she would like to take 2 tabs of the lexapro instead of 1 tab daily  [facility] [place]         Call back number: [phone]

## 2020-08-05 NOTE — TELEPHONE ENCOUNTER
I put in an order for Lexapro 20 mg, so she will take ONE pill NOT TWO 10s  I will see her next week  Thank you

## 2020-08-10 ENCOUNTER — OFFICE VISIT (OUTPATIENT)
Dept: FAMILY MEDICINE CLINIC | Facility: CLINIC | Age: 35
End: 2020-08-10
Payer: COMMERCIAL

## 2020-08-10 VITALS
BODY MASS INDEX: 25.3 KG/M2 | HEART RATE: 89 BPM | WEIGHT: 161.2 LBS | SYSTOLIC BLOOD PRESSURE: 128 MMHG | HEIGHT: 67 IN | TEMPERATURE: 98.4 F | RESPIRATION RATE: 16 BRPM | DIASTOLIC BLOOD PRESSURE: 84 MMHG

## 2020-08-10 DIAGNOSIS — F41.1 GAD (GENERALIZED ANXIETY DISORDER): Primary | ICD-10-CM

## 2020-08-10 PROCEDURE — 3008F BODY MASS INDEX DOCD: CPT | Performed by: PHYSICIAN ASSISTANT

## 2020-08-10 PROCEDURE — 99213 OFFICE O/P EST LOW 20 MIN: CPT | Performed by: PHYSICIAN ASSISTANT

## 2020-08-10 PROCEDURE — 1036F TOBACCO NON-USER: CPT | Performed by: PHYSICIAN ASSISTANT

## 2020-08-10 PROCEDURE — 3725F SCREEN DEPRESSION PERFORMED: CPT | Performed by: PHYSICIAN ASSISTANT

## 2020-08-10 NOTE — PROGRESS NOTES
Assessment/Plan:    1  CECE (generalized anxiety disorder)    - c/w Lexapro 20 mg once daily, continue with counseling, continue as is    2  BMI 25 0-25 9,adult    - encouraged patient to work on [employer], exercise  and adequate sleep for weight loss  Follow up if more help is needed    F/u 6 months or sooner if needed    Subjective:   Chief Complaint   Patient presents with    Anxiety      Patient ID: Patricia Gaona is a 37 y o  female  Patient here for follow up on Lexapro Now on 20 mg, feels like the weight of the world is off her shoulders  Has come out she is bisexual and  and family are accepting  Has looked into groups for support also  Feeling more herself         The following portions of the patient's history were reviewed and updated as appropriate: allergies, current medications, past family history, past medical history, past social history, past surgical history and problem list     Past Medical History:   Diagnosis Date    Anxiety     Concussion     recent trauma, improving s/s    Depression     Fibromyalgia      Past Surgical History:   Procedure Laterality Date    HYSTERECTOMY  02/21/2020    TUBAL LIGATION       Family History   Problem Relation Age of Onset    Depression Father     Alcohol abuse Neg Hx     Substance Abuse Neg Hx      Social History     Socioeconomic History    Marital status: /Civil Union     Spouse name: Not on file    Number of children: Not on file    Years of education: Not on file    Highest education level: Not on file   Occupational History    Not on file   Social Needs    Financial resource strain: Not on file    Food insecurity     Worry: Not on file     Inability: Not on file   [employer] needs     Medical: Not on file     Non-medical: Not on file   Tobacco Use    Smoking status: Never Smoker    Smokeless tobacco: Never Used   Substance and Sexual Activity    Alcohol use: Yes     Comment: social    Drug use: No    Sexual activity: Yes     Partners: Male   Lifestyle    Physical activity     Days per week: Not on file     Minutes per session: Not on file    Stress: Not on file   Relationships    Social connections     Talks on phone: Not on file     Gets together: Not on file     Attends Anglican service: Not on file     Active member of club or organization: Not on file     Attends meetings of clubs or organizations: Not on file     Relationship status: Not on file    Intimate partner violence     Fear of current or ex partner: Not on file     Emotionally abused: Not on file     Physically abused: Not on file     Forced sexual activity: Not on file   Other Topics Concern    Not on file   Social History Narrative    Not on file       Current Outpatient Medications:     escitalopram (LEXAPRO) 20 mg tablet, Take 1 tablet (20 mg total) by mouth daily, Disp: 30 tablet, Rfl: 0    EVENING PRIMROSE OIL PO, Take by mouth, Disp: , Rfl:     hydrOXYzine HCL (ATARAX) 25 mg tablet, Take 1 tablet (25 mg total) by mouth daily at bedtime as needed for anxiety, Disp: 20 tablet, Rfl: 0    VITAMIN E PO, Take by mouth, Disp: , Rfl:     Review of Systems          Objective:    Vitals:    08/10/20 1054   BP: 128/84   BP Location: Left arm   Patient Position: Sitting   Cuff Size: Standard   Pulse: 89   Resp: 16   Temp: 98 4 °F (36 9 °C)   TempSrc: Temporal   Weight: 73 1 kg (161 lb 3 2 oz)   Height: 5' 7 25" (1 708 m)        Physical Exam  Constitutional:       Appearance: She is well-developed  Cardiovascular:      Rate and Rhythm: Normal rate and regular rhythm  Heart sounds: Normal heart sounds  Pulmonary:      Effort: Pulmonary effort is normal       Breath sounds: Normal breath sounds  Skin:     General: Skin is warm  Neurological:      Mental Status: She is alert and oriented to person, place, and time  BMI Counseling: Body mass index is 25 06 kg/m²   The BMI is above normal  Nutrition recommendations include reducing portion sizes and decreasing overall calorie intake  Exercise recommendations include moderate aerobic physical activity for 150 minutes/week

## 2020-09-08 DIAGNOSIS — F41.1 ANXIETY STATE: ICD-10-CM

## 2020-09-08 DIAGNOSIS — M79.7 FIBROMYALGIA: ICD-10-CM

## 2020-09-08 RX ORDER — ESCITALOPRAM OXALATE 20 MG/1
TABLET ORAL
Qty: 30 TABLET | Refills: 0 | Status: SHIPPED | OUTPATIENT
Start: 2020-09-08 | End: 2020-10-07

## 2020-10-07 DIAGNOSIS — F41.1 ANXIETY STATE: ICD-10-CM

## 2020-10-07 DIAGNOSIS — M79.7 FIBROMYALGIA: ICD-10-CM

## 2020-10-07 RX ORDER — ESCITALOPRAM OXALATE 20 MG/1
TABLET ORAL
Qty: 30 TABLET | Refills: 0 | Status: SHIPPED | OUTPATIENT
Start: 2020-10-07 | End: 2020-11-06

## 2020-10-13 ENCOUNTER — IMMUNIZATIONS (OUTPATIENT)
Dept: FAMILY MEDICINE CLINIC | Facility: CLINIC | Age: 35
End: 2020-10-13
Payer: COMMERCIAL

## 2020-10-13 DIAGNOSIS — Z23 ENCOUNTER FOR IMMUNIZATION: ICD-10-CM

## 2020-10-13 PROCEDURE — 90686 IIV4 VACC NO PRSV 0.5 ML IM: CPT

## 2020-10-13 PROCEDURE — 90471 IMMUNIZATION ADMIN: CPT

## 2020-11-06 DIAGNOSIS — F41.1 ANXIETY STATE: ICD-10-CM

## 2020-11-06 DIAGNOSIS — M79.7 FIBROMYALGIA: ICD-10-CM

## 2020-11-06 RX ORDER — ESCITALOPRAM OXALATE 20 MG/1
TABLET ORAL
Qty: 30 TABLET | Refills: 0 | Status: SHIPPED | OUTPATIENT
Start: 2020-11-06 | End: 2020-12-11 | Stop reason: SDUPTHER

## 2020-12-11 ENCOUNTER — TELEMEDICINE (OUTPATIENT)
Dept: FAMILY MEDICINE CLINIC | Facility: CLINIC | Age: 35
End: 2020-12-11
Payer: COMMERCIAL

## 2020-12-11 VITALS — HEIGHT: 67 IN | WEIGHT: 161.4 LBS | BODY MASS INDEX: 25.33 KG/M2

## 2020-12-11 DIAGNOSIS — F41.1 ANXIETY STATE: ICD-10-CM

## 2020-12-11 DIAGNOSIS — M79.7 FIBROMYALGIA: ICD-10-CM

## 2020-12-11 PROCEDURE — 3008F BODY MASS INDEX DOCD: CPT | Performed by: PHYSICIAN ASSISTANT

## 2020-12-11 PROCEDURE — 99213 OFFICE O/P EST LOW 20 MIN: CPT | Performed by: PHYSICIAN ASSISTANT

## 2020-12-11 PROCEDURE — 1036F TOBACCO NON-USER: CPT | Performed by: PHYSICIAN ASSISTANT

## 2020-12-11 RX ORDER — BUPROPION HYDROCHLORIDE 150 MG/1
150 TABLET ORAL EVERY MORNING
Qty: 90 TABLET | Refills: 3 | Status: SHIPPED | OUTPATIENT
Start: 2020-12-11 | End: 2021-10-12 | Stop reason: SDUPTHER

## 2020-12-11 RX ORDER — ESCITALOPRAM OXALATE 20 MG/1
20 TABLET ORAL DAILY
Qty: 90 TABLET | Refills: 3 | Status: SHIPPED | OUTPATIENT
Start: 2020-12-11 | End: 2022-07-26

## 2021-01-13 ENCOUNTER — TELEMEDICINE (OUTPATIENT)
Dept: FAMILY MEDICINE CLINIC | Facility: CLINIC | Age: 36
End: 2021-01-13
Payer: COMMERCIAL

## 2021-01-13 VITALS — TEMPERATURE: 97.8 F

## 2021-01-13 DIAGNOSIS — R09.81 SINUS CONGESTION: Primary | ICD-10-CM

## 2021-01-13 PROCEDURE — 1036F TOBACCO NON-USER: CPT | Performed by: NURSE PRACTITIONER

## 2021-01-13 PROCEDURE — 99212 OFFICE O/P EST SF 10 MIN: CPT | Performed by: NURSE PRACTITIONER

## 2021-01-13 NOTE — PROGRESS NOTES
Virtual Regular Visit      Assessment/Plan:    Patient has had sinus congestion, sinus pressure & runny nose since 01/12  She denies recent travel or sick contact  My suspicion for covid is low  Patient is to continue Mucinex OTC as this has been providing sinus congestion relief  She was encouraged to maintain adequate fluid intake while at home  Patient to call office if her symptoms worsen or if she develops covid symptoms such as fever, chills, body aches, fatigue, cough, shortness of breath, etc  Patient states she understands and agrees with treatment plan  Problem List Items Addressed This Visit     None      Visit Diagnoses     Sinus congestion    -  Primary          BMI Counseling: There is no height or weight on file to calculate BMI  The BMI is above normal  Nutrition recommendations include decreasing portion sizes, encouraging healthy choices of fruits and vegetables, decreasing fast food intake, moderation in carbohydrate intake, increasing intake of lean protein and reducing intake of saturated and trans fat  Exercise recommendations include vigorous physical activity 75 minutes/week  No pharmacotherapy was ordered  Reason for visit is   Chief Complaint   Patient presents with    Virtual Regular Visit        Encounter provider CARMEN Cazares    Provider located at [address] [place] [address]  [phone]      Recent Visits  No visits were found meeting these conditions  Showing recent visits within past 7 days and meeting all other requirements     Today's Visits  Date Type Provider Dept   01/13/21 Telemedicine CARMEN Cazares [address]   Showing today's visits and meeting all other requirements     Future Appointments  No visits were found meeting these conditions     Showing future appointments within next 150 days and meeting all other requirements        The patient was identified by name and date of birth  Mary Pack was informed that this is a telemedicine visit and that the visit is being conducted through [employer] and patient was informed that this is not a secure, HIPAA-compliant platform  She agrees to proceed     My office door was closed  No one else was in the room  She acknowledged consent and understanding of privacy and security of the video platform  The patient has agreed to participate and understands they can discontinue the visit at any time  Patient is aware this is a billable service  Adam Wills is a 38 y o  female    Patient presents via televideo for sinus congestion & runny nose since 01/12  She denies out of state travel or sick contacts  She also denies fever, chills, NVD, loss of taste or smell, fatigue or muscle aches  Patient has recently tried Mucinex OTC and found relief already  Patient calling to have work note written  Past Medical History:   Diagnosis Date    Anxiety     Concussion     recent trauma, improving s/s    Depression     Fibromyalgia        Past Surgical History:   Procedure Laterality Date    HYSTERECTOMY  02/21/2020    TUBAL LIGATION         Current Outpatient Medications   Medication Sig Dispense Refill    buPROPion (WELLBUTRIN XL) 150 mg 24 hr tablet Take 1 tablet (150 mg total) by mouth every morning 90 tablet 3    escitalopram (LEXAPRO) 20 mg tablet Take 1 tablet (20 mg total) by mouth daily 90 tablet 3    hydrOXYzine HCL (ATARAX) 25 mg tablet Take 1 tablet (25 mg total) by mouth daily at bedtime as needed for anxiety 20 tablet 0     No current facility-administered medications for this visit  Allergies   Allergen Reactions    Bee Venom Hives    Amoxicillin Hives and Itching    Chlorine Itching    Pollen Extract Sneezing     Seasonal       Review of Systems   Constitutional: Negative    Negative for activity change, appetite change, chills, fatigue and fever  HENT: Positive for congestion, rhinorrhea, sinus pressure and sinus pain  Negative for postnasal drip, sore throat, trouble swallowing and voice change  Respiratory: Negative  Negative for cough, chest tightness and shortness of breath  Cardiovascular: Negative  Negative for chest pain  Neurological: Negative  Negative for light-headedness and headaches  Video Exam    Vitals:    01/13/21 1220   Temp: 97 8 °F (36 6 °C)       Physical Exam  Vitals signs reviewed  Constitutional:       Appearance: Normal appearance  Pulmonary:      Effort: Pulmonary effort is normal    Skin:     Coloration: Skin is not pale  Neurological:      Mental Status: She is alert and oriented to person, place, and time  Mental status is at baseline  I spent 15 minutes directly with the patient during this visit      Etta Martins 12  acknowledges that she has consented to an online visit or consultation  She understands that the online visit is based solely on information provided by her, and that, in the absence of a face-to-face physical evaluation by the physician, the diagnosis she receives is both limited and provisional in terms of accuracy and completeness  This is not intended to replace a full medical face-to-face evaluation by the physician  [facility] understands and accepts these terms

## 2021-01-13 NOTE — LETTER
January 13, 2021     Patient: [facility]   YOB: 1985   Date of Visit: 1/13/2021       To Whom it May Concern:    [facility] is under my professional care  She was seen in my office on 1/13/2021  She may return to work on Tuesday 01/19/2021  If you have any questions or concerns, please don't hesitate to call           Sincerely,          CARMEN Bhatti        CC: No Recipients

## 2021-01-14 ENCOUNTER — TELEPHONE (OUTPATIENT)
Dept: FAMILY MEDICINE CLINIC | Facility: CLINIC | Age: 36
End: 2021-01-14

## 2021-01-14 DIAGNOSIS — Z20.822 SUSPECTED COVID-19 VIRUS INFECTION: ICD-10-CM

## 2021-01-14 DIAGNOSIS — Z20.822 SUSPECTED COVID-19 VIRUS INFECTION: Primary | ICD-10-CM

## 2021-01-14 PROCEDURE — U0005 INFEC AGEN DETEC AMPLI PROBE: HCPCS

## 2021-01-14 PROCEDURE — U0003 INFECTIOUS AGENT DETECTION BY NUCLEIC ACID (DNA OR RNA); SEVERE ACUTE RESPIRATORY SYNDROME CORONAVIRUS 2 (SARS-COV-2) (CORONAVIRUS DISEASE [COVID-19]), AMPLIFIED PROBE TECHNIQUE, MAKING USE OF HIGH THROUGHPUT TECHNOLOGIES AS DESCRIBED BY CMS-2020-01-R: HCPCS

## 2021-01-15 LAB — SARS-COV-2 RNA RESP QL NAA+PROBE: NEGATIVE

## 2021-02-26 DIAGNOSIS — Z23 ENCOUNTER FOR IMMUNIZATION: ICD-10-CM

## 2021-03-01 ENCOUNTER — IMMUNIZATIONS (OUTPATIENT)
Dept: FAMILY MEDICINE CLINIC | Facility: HOSPITAL | Age: 36
End: 2021-03-01

## 2021-03-01 DIAGNOSIS — Z23 ENCOUNTER FOR IMMUNIZATION: Primary | ICD-10-CM

## 2021-03-01 PROCEDURE — 91300 SARS-COV-2 / COVID-19 MRNA VACCINE (PFIZER-BIONTECH) 30 MCG: CPT

## 2021-03-01 PROCEDURE — 0001A SARS-COV-2 / COVID-19 MRNA VACCINE (PFIZER-BIONTECH) 30 MCG: CPT

## 2021-03-03 ENCOUNTER — TELEPHONE (OUTPATIENT)
Dept: FAMILY MEDICINE CLINIC | Facility: CLINIC | Age: 36
End: 2021-03-03

## 2021-03-03 NOTE — TELEPHONE ENCOUNTER
Hope got her first COVID vaccine on Monday  Her arm is noticeably swollen and red  Painful but doesn't feel hot to the touch  Anything she should do?

## 2021-03-25 ENCOUNTER — IMMUNIZATIONS (OUTPATIENT)
Dept: FAMILY MEDICINE CLINIC | Facility: HOSPITAL | Age: 36
End: 2021-03-25

## 2021-03-25 DIAGNOSIS — Z23 ENCOUNTER FOR IMMUNIZATION: Primary | ICD-10-CM

## 2021-03-25 PROCEDURE — 91300 SARS-COV-2 / COVID-19 MRNA VACCINE (PFIZER-BIONTECH) 30 MCG: CPT

## 2021-03-25 PROCEDURE — 0002A SARS-COV-2 / COVID-19 MRNA VACCINE (PFIZER-BIONTECH) 30 MCG: CPT

## 2021-10-12 ENCOUNTER — OFFICE VISIT (OUTPATIENT)
Dept: FAMILY MEDICINE CLINIC | Facility: CLINIC | Age: 36
End: 2021-10-12
Payer: COMMERCIAL

## 2021-10-12 VITALS
WEIGHT: 171.2 LBS | HEART RATE: 110 BPM | RESPIRATION RATE: 16 BRPM | HEIGHT: 68 IN | BODY MASS INDEX: 25.94 KG/M2 | DIASTOLIC BLOOD PRESSURE: 80 MMHG | SYSTOLIC BLOOD PRESSURE: 124 MMHG

## 2021-10-12 DIAGNOSIS — F33.9 DEPRESSION, RECURRENT (HCC): ICD-10-CM

## 2021-10-12 DIAGNOSIS — F41.1 GENERALIZED ANXIETY DISORDER: Primary | ICD-10-CM

## 2021-10-12 PROBLEM — S09.90XA HEAD TRAUMA, INITIAL ENCOUNTER: Status: RESOLVED | Noted: 2018-10-03 | Resolved: 2021-10-12

## 2021-10-12 PROCEDURE — 3008F BODY MASS INDEX DOCD: CPT | Performed by: PHYSICIAN ASSISTANT

## 2021-10-12 PROCEDURE — 3725F SCREEN DEPRESSION PERFORMED: CPT | Performed by: PHYSICIAN ASSISTANT

## 2021-10-12 PROCEDURE — 1036F TOBACCO NON-USER: CPT | Performed by: PHYSICIAN ASSISTANT

## 2021-10-12 PROCEDURE — 99214 OFFICE O/P EST MOD 30 MIN: CPT | Performed by: PHYSICIAN ASSISTANT

## 2021-10-12 RX ORDER — BUPROPION HYDROCHLORIDE 300 MG/1
300 TABLET ORAL EVERY MORNING
Qty: 90 TABLET | Refills: 1 | Status: SHIPPED | OUTPATIENT
Start: 2021-10-12 | End: 2022-05-20

## 2021-12-04 ENCOUNTER — IMMUNIZATIONS (OUTPATIENT)
Dept: FAMILY MEDICINE CLINIC | Facility: HOSPITAL | Age: 36
End: 2021-12-04

## 2021-12-04 DIAGNOSIS — Z23 ENCOUNTER FOR IMMUNIZATION: Primary | ICD-10-CM

## 2021-12-04 PROCEDURE — 91300 COVID-19 PFIZER VACC 0.3 ML: CPT

## 2021-12-04 PROCEDURE — 0001A COVID-19 PFIZER VACC 0.3 ML: CPT

## 2022-05-29 DIAGNOSIS — F41.1 GENERALIZED ANXIETY DISORDER: ICD-10-CM

## 2022-06-29 DIAGNOSIS — F41.1 GENERALIZED ANXIETY DISORDER: ICD-10-CM

## 2022-07-26 ENCOUNTER — OFFICE VISIT (OUTPATIENT)
Dept: FAMILY MEDICINE CLINIC | Facility: CLINIC | Age: 37
End: 2022-07-26
Payer: COMMERCIAL

## 2022-07-26 VITALS
RESPIRATION RATE: 16 BRPM | DIASTOLIC BLOOD PRESSURE: 64 MMHG | SYSTOLIC BLOOD PRESSURE: 122 MMHG | BODY MASS INDEX: 25.83 KG/M2 | HEIGHT: 67 IN | WEIGHT: 164.6 LBS | HEART RATE: 114 BPM

## 2022-07-26 DIAGNOSIS — F33.9 DEPRESSION, RECURRENT (HCC): ICD-10-CM

## 2022-07-26 DIAGNOSIS — F41.1 GENERALIZED ANXIETY DISORDER: Primary | ICD-10-CM

## 2022-07-26 PROBLEM — S06.0X0A CONCUSSION WITH NO LOSS OF CONSCIOUSNESS: Status: RESOLVED | Noted: 2018-10-03 | Resolved: 2022-07-26

## 2022-07-26 PROBLEM — H51.11 CONVERGENCE INSUFFICIENCY: Status: RESOLVED | Noted: 2018-10-03 | Resolved: 2022-07-26

## 2022-07-26 PROCEDURE — 99214 OFFICE O/P EST MOD 30 MIN: CPT | Performed by: PHYSICIAN ASSISTANT

## 2022-07-26 NOTE — PROGRESS NOTES
Assessment/Plan:    1  CECE - will increase zoloft to 75 mg and continue wellbutrin 300 mg once daily, continue with healthy lifestyle  If no better in 4 weeks reach out    2  Depression - as above    F/u as needed    Subjective:   Chief Complaint   Patient presents with    Anxiety      Patient ID: Umu Estrada is a 43 y o  female  Patient has been feeling more anxious  Has one year left of school and has studying Praxis  Feels she needs to increase Zoloft        The following portions of the patient's history were reviewed and updated as appropriate: allergies, current medications, past family history, past medical history, past social history, past surgical history and problem list     Past Medical History:   Diagnosis Date    Anxiety     Concussion     recent trauma, improving s/s    Depression     Fibromyalgia      Past Surgical History:   Procedure Laterality Date    HYSTERECTOMY  02/21/2020    TUBAL LIGATION       Family History   Problem Relation Age of Onset    Depression Father     Alcohol abuse Neg Hx     Substance Abuse Neg Hx      Social History     Socioeconomic History    Marital status: /Civil Union     Spouse name: Not on file    Number of children: Not on file    Years of education: Not on file    Highest education level: Not on file   Occupational History    Not on file   Tobacco Use    Smoking status: Never Smoker    Smokeless tobacco: Never Used   Vaping Use    Vaping Use: Never used   Substance and Sexual Activity    Alcohol use: Yes     Comment: social    Drug use: No    Sexual activity: Yes     Partners: Male   Other Topics Concern    Not on file   Social History Narrative    Not on file     Social Determinants of Health     Financial Resource Strain: Not on file   Food Insecurity: Not on file   Transportation Needs: Not on file   Physical Activity: Not on file   Stress: Not on file   Social Connections: Not on file   Intimate Partner Violence: Not on file Housing Stability: Not on file       Current Outpatient Medications:     buPROPion (WELLBUTRIN XL) 300 mg 24 hr tablet, TAKE ONE TABLET BY MOUTH EVERY MORNING, Disp: 90 tablet, Rfl: 0    sertraline (ZOLOFT) 50 mg tablet, TAKE 1 TABLET BY MOUTH EVERY DAY, Disp: 30 tablet, Rfl: 0    escitalopram (LEXAPRO) 20 mg tablet, Take 1 tablet (20 mg total) by mouth daily (Patient not taking: Reported on 7/26/2022), Disp: 90 tablet, Rfl: 3    Review of Systems          Objective:    Vitals:    07/26/22 1350   BP: 122/64   Pulse: (!) 114   Resp: 16   Weight: 74 7 kg (164 lb 9 6 oz)   Height: 5' 7" (1 702 m)        Physical Exam  Constitutional:       Appearance: Normal appearance  Neurological:      General: No focal deficit present  Mental Status: She is alert and oriented to person, place, and time  Psychiatric:         Mood and Affect: Mood normal          Behavior: Behavior normal          Thought Content: Thought content normal          Judgment: Judgment normal          I have spent 30 minutes with Patient  today in which greater than 50% of this time was spent in counseling/coordination of care regarding Risks and benefits of tx options, Intructions for management, Patient and family education, Importance of tx compliance, Risk factor reductions and Impressions  BMI Counseling: Body mass index is 25 78 kg/m²  The BMI is above normal  Nutrition recommendations include reducing portion sizes

## 2022-08-20 DIAGNOSIS — F41.1 GENERALIZED ANXIETY DISORDER: ICD-10-CM

## 2022-08-20 DIAGNOSIS — F33.9 DEPRESSION, RECURRENT (HCC): ICD-10-CM

## 2022-08-20 RX ORDER — BUPROPION HYDROCHLORIDE 300 MG/1
TABLET ORAL
Qty: 90 TABLET | Refills: 0 | Status: SHIPPED | OUTPATIENT
Start: 2022-08-20

## 2022-09-09 DIAGNOSIS — F41.1 GENERALIZED ANXIETY DISORDER: ICD-10-CM

## 2022-10-02 DIAGNOSIS — F41.1 GENERALIZED ANXIETY DISORDER: ICD-10-CM

## 2022-10-11 ENCOUNTER — CLINICAL SUPPORT (OUTPATIENT)
Dept: FAMILY MEDICINE CLINIC | Facility: CLINIC | Age: 37
End: 2022-10-11
Payer: COMMERCIAL

## 2022-10-11 ENCOUNTER — TELEPHONE (OUTPATIENT)
Dept: FAMILY MEDICINE CLINIC | Facility: CLINIC | Age: 37
End: 2022-10-11

## 2022-10-11 DIAGNOSIS — Z20.822 SUSPECTED COVID-19 VIRUS INFECTION: Primary | ICD-10-CM

## 2022-10-11 LAB
SARS-COV-2 AG UPPER RESP QL IA: POSITIVE
VALID CONTROL: ABNORMAL

## 2022-10-11 PROCEDURE — 87811 SARS-COV-2 COVID19 W/OPTIC: CPT

## 2022-10-11 NOTE — TELEPHONE ENCOUNTER
Please let patient know she is positive for covid, remind her of the 5 days of isolation and then the next 5 days of mask wearing  Must be fever free for 24 hrs without medications before can leave isolation  Please let me know if she has any concerns or questions

## 2022-10-11 NOTE — TELEPHONE ENCOUNTER
JOAO Gray I received a phone call from [facility] [employer] this AM   She states that she started feeling ill last Thursday and hasn't been feeling well since  Her symptoms were sore throat/congestion/fatigue but no fever  On Monday she found out that a coworker tested positive for COVID and she had close contact with that person  Wondered if she should take a COVID test and also whether she should stop going to work  I advised her to do a home COVID test and she should advise her employer whether it is positive or negative  I asked whether she wanted an appointment and she said, at this time, she was getting better but would call if things changed

## 2022-10-20 DIAGNOSIS — F41.1 GENERALIZED ANXIETY DISORDER: ICD-10-CM

## 2022-10-26 NOTE — TELEPHONE ENCOUNTER
When Nancy Celestin was seen it was not a PE  She will need to come in for the PE for me to complete the form   I will put the form in folder in the back to hold until she does come in Quinolones Pregnancy And Lactation Text: This medication is Pregnancy Category C and it isn't know if it is safe during pregnancy. It is also excreted in breast milk.

## 2022-11-11 DIAGNOSIS — F41.1 GENERALIZED ANXIETY DISORDER: ICD-10-CM

## 2022-11-20 DIAGNOSIS — F41.1 GENERALIZED ANXIETY DISORDER: ICD-10-CM

## 2022-11-20 DIAGNOSIS — F33.9 DEPRESSION, RECURRENT (HCC): ICD-10-CM

## 2022-11-20 RX ORDER — BUPROPION HYDROCHLORIDE 300 MG/1
TABLET ORAL
Qty: 90 TABLET | Refills: 0 | Status: SHIPPED | OUTPATIENT
Start: 2022-11-20

## 2022-12-01 DIAGNOSIS — F41.1 GENERALIZED ANXIETY DISORDER: ICD-10-CM

## 2022-12-20 DIAGNOSIS — F41.1 GENERALIZED ANXIETY DISORDER: ICD-10-CM

## 2022-12-20 RX ORDER — SERTRALINE HYDROCHLORIDE 100 MG/1
100 TABLET, FILM COATED ORAL DAILY
Qty: 90 TABLET | Refills: 1 | Status: SHIPPED | OUTPATIENT
Start: 2022-12-20 | End: 2023-02-22 | Stop reason: SDUPTHER

## 2023-02-22 ENCOUNTER — OFFICE VISIT (OUTPATIENT)
Dept: FAMILY MEDICINE CLINIC | Facility: CLINIC | Age: 38
End: 2023-02-22

## 2023-02-22 VITALS
BODY MASS INDEX: 26.21 KG/M2 | SYSTOLIC BLOOD PRESSURE: 132 MMHG | OXYGEN SATURATION: 98 % | DIASTOLIC BLOOD PRESSURE: 84 MMHG | RESPIRATION RATE: 18 BRPM | HEIGHT: 67 IN | WEIGHT: 167 LBS | HEART RATE: 96 BPM

## 2023-02-22 DIAGNOSIS — F41.1 GENERALIZED ANXIETY DISORDER: ICD-10-CM

## 2023-02-22 DIAGNOSIS — F33.9 DEPRESSION, RECURRENT (HCC): Primary | ICD-10-CM

## 2023-02-22 PROBLEM — R55 SYNCOPE DUE TO AUTONOMIC FAILURE: Status: RESOLVED | Noted: 2018-10-03 | Resolved: 2023-02-22

## 2023-02-22 PROBLEM — G44.311 INTRACTABLE ACUTE POST-TRAUMATIC HEADACHE: Status: RESOLVED | Noted: 2018-10-03 | Resolved: 2023-02-22

## 2023-02-22 RX ORDER — SERTRALINE HYDROCHLORIDE 100 MG/1
150 TABLET, FILM COATED ORAL DAILY
Qty: 135 TABLET | Refills: 0 | Status: SHIPPED | OUTPATIENT
Start: 2023-02-22 | End: 2023-05-23

## 2023-02-22 NOTE — PROGRESS NOTES
Assessment/Plan:    1  Depression/anxiety - increase zoloft 150 mg daily, continue wellbutrin 300 mg  Continue with therapist weekly  Work on healthy lifestyle  F/u 2 weeks consider change of medication  F/u as needed      Subjective:   Chief Complaint   Patient presents with   • Anxiety     Medication stopped being effective in December      Patient ID: Ev Corona is a 42 y o  female  Patient has been struggling since December, dreading to go to work, sad, fatigued, irritable  Has had to walk out of work situations to calm down  Anxiety at work  School is stressful  Getting ready to take Praxis, concerned about this  Son dx with ADHD, just started on medications  In December increased to Zoloft 100 mg, buproprion 300 mg  Still meeting with counselor weekly  Eating healthy, not exercising, not sleeping well  Is meditating        The following portions of the patient's history were reviewed and updated as appropriate: allergies, current medications, past family history, past medical history, past social history, past surgical history and problem list     Past Medical History:   Diagnosis Date   • Anxiety    • Concussion     recent trauma, improving s/s   • Depression    • Fibromyalgia      Past Surgical History:   Procedure Laterality Date   • HYSTERECTOMY  02/21/2020   • TUBAL LIGATION       Family History   Problem Relation Age of Onset   • Depression Father    • Alcohol abuse Neg Hx    • Substance Abuse Neg Hx      Social History     Socioeconomic History   • Marital status: /Civil Union     Spouse name: Not on file   • Number of children: Not on file   • Years of education: Not on file   • Highest education level: Not on file   Occupational History   • Not on file   Tobacco Use   • Smoking status: Never   • Smokeless tobacco: Never   Vaping Use   • Vaping Use: Never used   Substance and Sexual Activity   • Alcohol use: Yes     Comment: social   • Drug use: No   • Sexual activity: Yes     Partners: Male   Other Topics Concern   • Not on file   Social History Narrative   • Not on file     Social Determinants of Health     Financial Resource Strain: Not on file   Food Insecurity: Not on file   Transportation Needs: Not on file   Physical Activity: Not on file   Stress: Not on file   Social Connections: Not on file   Intimate Partner Violence: Not on file   Housing Stability: Not on file       Current Outpatient Medications:   •  buPROPion (WELLBUTRIN XL) 300 mg 24 hr tablet, TAKE ONE TABLET BY MOUTH EVERY DAY IN THE MORNING, Disp: 90 tablet, Rfl: 0  •  sertraline (ZOLOFT) 100 mg tablet, Take 1 tablet (100 mg total) by mouth daily, Disp: 90 tablet, Rfl: 1    Review of Systems          Objective:    Vitals:    02/22/23 1327   BP: 132/84   BP Location: Left arm   Patient Position: Sitting   Cuff Size: Standard   Pulse: 96   Resp: 18   SpO2: 98%   Weight: 75 8 kg (167 lb)   Height: 5' 7" (1 702 m)        Physical Exam  Constitutional:       Appearance: Normal appearance  She is normal weight  HENT:      Head: Normocephalic and atraumatic  Neurological:      General: No focal deficit present  Mental Status: She is alert and oriented to person, place, and time  Psychiatric:         Mood and Affect: Mood normal          Behavior: Behavior normal          Thought Content: Thought content normal          Judgment: Judgment normal              Depression Screening Follow-up Plan: Patient's depression screening was positive with a PHQ-2 score of   Their PHQ-9 score was 7  Patient assessed for underlying major depression  They have no active suicidal ideations  Brief counseling provided and recommend additional follow-up/re-evaluation next office visit  BMI Counseling: Body mass index is 26 16 kg/m²  The BMI is above normal  Nutrition recommendations include reducing portion sizes

## 2023-05-31 DIAGNOSIS — F41.1 GENERALIZED ANXIETY DISORDER: ICD-10-CM

## 2023-05-31 DIAGNOSIS — F33.9 DEPRESSION, RECURRENT (HCC): ICD-10-CM

## 2023-06-01 RX ORDER — SERTRALINE HYDROCHLORIDE 100 MG/1
150 TABLET, FILM COATED ORAL DAILY
Qty: 135 TABLET | Refills: 0 | Status: SHIPPED | OUTPATIENT
Start: 2023-06-01 | End: 2023-08-30

## 2023-06-01 RX ORDER — BUPROPION HYDROCHLORIDE 300 MG/1
300 TABLET ORAL EVERY MORNING
Qty: 90 TABLET | Refills: 0 | Status: SHIPPED | OUTPATIENT
Start: 2023-06-01

## 2023-09-10 DIAGNOSIS — F41.1 GENERALIZED ANXIETY DISORDER: ICD-10-CM

## 2023-09-10 DIAGNOSIS — F33.9 DEPRESSION, RECURRENT (HCC): ICD-10-CM

## 2023-09-11 RX ORDER — BUPROPION HYDROCHLORIDE 300 MG/1
300 TABLET ORAL EVERY MORNING
Qty: 90 TABLET | Refills: 0 | Status: SHIPPED | OUTPATIENT
Start: 2023-09-11 | End: 2023-09-21 | Stop reason: SDUPTHER

## 2023-11-05 ENCOUNTER — HOSPITAL ENCOUNTER (EMERGENCY)
Facility: HOSPITAL | Age: 38
Discharge: HOME | End: 2023-11-06
Attending: EMERGENCY MEDICINE
Payer: COMMERCIAL

## 2023-11-05 VITALS
RESPIRATION RATE: 18 BRPM | SYSTOLIC BLOOD PRESSURE: 144 MMHG | TEMPERATURE: 98.6 F | DIASTOLIC BLOOD PRESSURE: 104 MMHG | HEART RATE: 114 BPM | OXYGEN SATURATION: 95 %

## 2023-11-05 DIAGNOSIS — R11.2 NAUSEA AND VOMITING: Primary | ICD-10-CM

## 2023-11-05 DIAGNOSIS — R19.7 DIARRHEA: ICD-10-CM

## 2023-11-05 LAB
ALBUMIN SERPL BCP-MCNC: 4.6 G/DL (ref 3.5–5)
ALP SERPL-CCNC: 72 U/L (ref 34–104)
ALT SERPL W P-5'-P-CCNC: 10 U/L (ref 7–52)
ANION GAP SERPL CALCULATED.3IONS-SCNC: 8 MMOL/L
AST SERPL W P-5'-P-CCNC: 12 U/L (ref 13–39)
ATRIAL RATE: 103 BPM
BASOPHILS # BLD AUTO: 0.01 THOUSANDS/ÂΜL (ref 0–0.1)
BASOPHILS NFR BLD AUTO: 0 % (ref 0–1)
BILIRUB SERPL-MCNC: 0.71 MG/DL (ref 0.2–1)
BUN SERPL-MCNC: 14 MG/DL (ref 5–25)
CALCIUM SERPL-MCNC: 9.3 MG/DL (ref 8.4–10.2)
CHLORIDE SERPL-SCNC: 105 MMOL/L (ref 96–108)
CO2 SERPL-SCNC: 26 MMOL/L (ref 21–32)
CREAT SERPL-MCNC: 0.85 MG/DL (ref 0.6–1.3)
EOSINOPHIL # BLD AUTO: 0.11 THOUSAND/ÂΜL (ref 0–0.61)
EOSINOPHIL NFR BLD AUTO: 1 % (ref 0–6)
ERYTHROCYTE [DISTWIDTH] IN BLOOD BY AUTOMATED COUNT: 11.7 % (ref 11.6–15.1)
EXT PREGNANCY TEST URINE: NEGATIVE
EXT. CONTROL: NORMAL
GFR SERPL CREATININE-BSD FRML MDRD: 86 ML/MIN/1.73SQ M
GLUCOSE SERPL-MCNC: 127 MG/DL (ref 65–140)
HCT VFR BLD AUTO: 46.3 % (ref 34.8–46.1)
HGB BLD-MCNC: 15.7 G/DL (ref 11.5–15.4)
IMM GRANULOCYTES # BLD AUTO: 0.04 THOUSAND/UL (ref 0–0.2)
IMM GRANULOCYTES NFR BLD AUTO: 0 % (ref 0–2)
LIPASE SERPL-CCNC: 18 U/L (ref 11–82)
LYMPHOCYTES # BLD AUTO: 0.4 THOUSANDS/ÂΜL (ref 0.6–4.47)
LYMPHOCYTES NFR BLD AUTO: 3 % (ref 14–44)
MCH RBC QN AUTO: 30.8 PG (ref 26.8–34.3)
MCHC RBC AUTO-ENTMCNC: 33.9 G/DL (ref 31.4–37.4)
MCV RBC AUTO: 91 FL (ref 82–98)
MONOCYTES # BLD AUTO: 0.29 THOUSAND/ÂΜL (ref 0.17–1.22)
MONOCYTES NFR BLD AUTO: 3 % (ref 4–12)
NEUTROPHILS # BLD AUTO: 10.82 THOUSANDS/ÂΜL (ref 1.85–7.62)
NEUTS SEG NFR BLD AUTO: 93 % (ref 43–75)
NRBC BLD AUTO-RTO: 0 /100 WBCS
P AXIS: 66 DEGREES
PLATELET # BLD AUTO: 358 THOUSANDS/UL (ref 149–390)
PMV BLD AUTO: 8.3 FL (ref 8.9–12.7)
POTASSIUM SERPL-SCNC: 4.6 MMOL/L (ref 3.5–5.3)
PR INTERVAL: 152 MS
PROT SERPL-MCNC: 7.8 G/DL (ref 6.4–8.4)
QRS AXIS: 74 DEGREES
QRSD INTERVAL: 68 MS
QT INTERVAL: 332 MS
QTC INTERVAL: 434 MS
RBC # BLD AUTO: 5.1 MILLION/UL (ref 3.81–5.12)
SODIUM SERPL-SCNC: 139 MMOL/L (ref 135–147)
T WAVE AXIS: 59 DEGREES
VENTRICULAR RATE: 103 BPM
WBC # BLD AUTO: 11.67 THOUSAND/UL (ref 4.31–10.16)

## 2023-11-05 PROCEDURE — 99285 EMERGENCY DEPT VISIT HI MDM: CPT | Performed by: EMERGENCY MEDICINE

## 2023-11-05 PROCEDURE — 85025 COMPLETE CBC W/AUTO DIFF WBC: CPT | Performed by: EMERGENCY MEDICINE

## 2023-11-05 PROCEDURE — 81025 URINE PREGNANCY TEST: CPT

## 2023-11-05 PROCEDURE — 83690 ASSAY OF LIPASE: CPT | Performed by: EMERGENCY MEDICINE

## 2023-11-05 PROCEDURE — 93010 ELECTROCARDIOGRAM REPORT: CPT | Performed by: INTERNAL MEDICINE

## 2023-11-05 PROCEDURE — 93005 ELECTROCARDIOGRAM TRACING: CPT

## 2023-11-05 PROCEDURE — 36415 COLL VENOUS BLD VENIPUNCTURE: CPT

## 2023-11-05 PROCEDURE — 99284 EMERGENCY DEPT VISIT MOD MDM: CPT

## 2023-11-05 PROCEDURE — 80053 COMPREHEN METABOLIC PANEL: CPT | Performed by: EMERGENCY MEDICINE

## 2023-11-05 RX ORDER — ONDANSETRON 2 MG/ML
4 INJECTION INTRAMUSCULAR; INTRAVENOUS ONCE
Status: COMPLETED | OUTPATIENT
Start: 2023-11-05 | End: 2023-11-06

## 2023-11-05 RX ORDER — KETOROLAC TROMETHAMINE 30 MG/ML
15 INJECTION, SOLUTION INTRAMUSCULAR; INTRAVENOUS ONCE
Status: COMPLETED | OUTPATIENT
Start: 2023-11-05 | End: 2023-11-06

## 2023-11-05 NOTE — Clinical Note
Jessica Estevez was seen and treated in our emergency department on 11/5/2023. Diagnosis:     Elyse  may return to work on return date. She may return on this date: 11/09/2023    Jessica Estevez was seen in our ED today. Please excuse Jessica Estevez for any work/school missed and allow Jessica Estevez to return on the date listed above. If you have any questions or concerns, please don't hesitate to call.       Benson Hoyos, DO    ______________________________           _______________          _______________  Hospital Representative                              Date                                Time

## 2023-11-06 ENCOUNTER — APPOINTMENT (EMERGENCY)
Dept: RADIOLOGY | Facility: HOSPITAL | Age: 38
End: 2023-11-06
Payer: COMMERCIAL

## 2023-11-06 LAB
AMORPH URATE CRY URNS QL MICRO: ABNORMAL
BACTERIA UR QL AUTO: ABNORMAL /HPF
BILIRUB UR QL STRIP: NEGATIVE
CLARITY UR: ABNORMAL
COLOR UR: ABNORMAL
GLUCOSE UR STRIP-MCNC: NEGATIVE MG/DL
HGB UR QL STRIP.AUTO: NEGATIVE
KETONES UR STRIP-MCNC: NEGATIVE MG/DL
LEUKOCYTE ESTERASE UR QL STRIP: ABNORMAL
MUCOUS THREADS UR QL AUTO: ABNORMAL
NITRITE UR QL STRIP: NEGATIVE
NON-SQ EPI CELLS URNS QL MICRO: ABNORMAL /HPF
PH UR STRIP.AUTO: 5.5 [PH]
PROT UR STRIP-MCNC: ABNORMAL MG/DL
RBC #/AREA URNS AUTO: ABNORMAL /HPF
SP GR UR STRIP.AUTO: 1.03 (ref 1–1.03)
UROBILINOGEN UR STRIP-ACNC: 2 MG/DL
WBC #/AREA URNS AUTO: ABNORMAL /HPF

## 2023-11-06 PROCEDURE — 96374 THER/PROPH/DIAG INJ IV PUSH: CPT

## 2023-11-06 PROCEDURE — G1004 CDSM NDSC: HCPCS

## 2023-11-06 PROCEDURE — 81001 URINALYSIS AUTO W/SCOPE: CPT

## 2023-11-06 PROCEDURE — 96375 TX/PRO/DX INJ NEW DRUG ADDON: CPT

## 2023-11-06 PROCEDURE — 96361 HYDRATE IV INFUSION ADD-ON: CPT

## 2023-11-06 PROCEDURE — 74177 CT ABD & PELVIS W/CONTRAST: CPT

## 2023-11-06 RX ORDER — ONDANSETRON 4 MG/1
4 TABLET, ORALLY DISINTEGRATING ORAL EVERY 6 HOURS PRN
Qty: 6 TABLET | Refills: 0 | Status: SHIPPED | OUTPATIENT
Start: 2023-11-06

## 2023-11-06 RX ADMIN — KETOROLAC TROMETHAMINE 15 MG: 30 INJECTION, SOLUTION INTRAMUSCULAR; INTRAVENOUS at 00:36

## 2023-11-06 RX ADMIN — IOHEXOL 85 ML: 350 INJECTION, SOLUTION INTRAVENOUS at 00:15

## 2023-11-06 RX ADMIN — ONDANSETRON 4 MG: 2 INJECTION INTRAMUSCULAR; INTRAVENOUS at 00:36

## 2023-11-06 RX ADMIN — SODIUM CHLORIDE 1000 ML: 0.9 INJECTION, SOLUTION INTRAVENOUS at 00:36

## 2023-11-06 NOTE — ED PROVIDER NOTES
History  Chief Complaint   Patient presents with    Vomiting     PT c/o vomiting since 9pm today. PT had IBS and says her flare ups are not usually this bad. PT states she has never vomited from a flare up before. C/o lower abd pain     Patient is a 66-year-old female with a significant past medical history of IBS, presenting for evaluation of vomiting. She reports that over the course of the day she has had some lower abdominal cramping as well as several episodes of nonbloody, nonmelanotic diarrhea. She originally thought that this was just a flare of her IBS, but she then developed nausea and 4 episodes of nonbloody, nonbilious vomiting which is not typical for her. She denies fevers, she denies urinary symptoms. She has a surgical history of hysterectomy and tubal ligation. Prior to Admission Medications   Prescriptions Last Dose Informant Patient Reported? Taking? buPROPion (WELLBUTRIN XL) 300 mg 24 hr tablet   No No   Sig: Take 1 tablet (300 mg total) by mouth every morning   sertraline (ZOLOFT) 100 mg tablet   No No   Sig: Take 1.5 tablets (150 mg total) by mouth daily      Facility-Administered Medications: None       Past Medical History:   Diagnosis Date    Anxiety     Concussion     recent trauma, improving s/s    Depression     Fibromyalgia        Past Surgical History:   Procedure Laterality Date    HYSTERECTOMY  02/21/2020    TUBAL LIGATION         Family History   Problem Relation Age of Onset    Depression Father     Alcohol abuse Neg Hx     Substance Abuse Neg Hx      I have reviewed and agree with the history as documented. E-Cigarette/Vaping    E-Cigarette Use Never User      E-Cigarette/Vaping Substances     Social History     Tobacco Use    Smoking status: Never    Smokeless tobacco: Never   Vaping Use    Vaping Use: Never used   Substance Use Topics    Alcohol use: Yes     Comment: social    Drug use: No        Review of Systems   Constitutional:  Negative for fever. Gastrointestinal:  Positive for abdominal pain, diarrhea, nausea and vomiting. Genitourinary:  Negative for dysuria and hematuria. All other systems reviewed and are negative. Physical Exam  ED Triage Vitals   Temperature Pulse Respirations Blood Pressure SpO2   11/05/23 2129 11/05/23 2129 11/05/23 2129 11/05/23 2131 11/05/23 2129   98.6 °F (37 °C) (!) 114 18 (!) 144/104 95 %      Temp Source Heart Rate Source Patient Position - Orthostatic VS BP Location FiO2 (%)   11/05/23 2129 11/05/23 2129 -- -- --   Tympanic Monitor         Pain Score       11/05/23 2129       4             Orthostatic Vital Signs  Vitals:    11/05/23 2129 11/05/23 2131   BP:  (!) 144/104   Pulse: (!) 114        Physical Exam  Vitals and nursing note reviewed. Constitutional:       General: She is not in acute distress. Appearance: Normal appearance. She is not ill-appearing or toxic-appearing. HENT:      Head: Normocephalic and atraumatic. Right Ear: External ear normal.      Left Ear: External ear normal.      Nose: Nose normal.   Eyes:      General: No scleral icterus. Right eye: No discharge. Left eye: No discharge. Extraocular Movements: Extraocular movements intact. Conjunctiva/sclera: Conjunctivae normal.   Cardiovascular:      Rate and Rhythm: Normal rate. Heart sounds: Normal heart sounds. No murmur heard. No friction rub. No gallop. Pulmonary:      Effort: Pulmonary effort is normal. No respiratory distress. Breath sounds: Normal breath sounds. Abdominal:      General: Abdomen is flat. There is no distension. Palpations: Abdomen is soft. There is no mass. Tenderness: There is abdominal tenderness in the suprapubic area. There is no right CVA tenderness, left CVA tenderness or guarding. Genitourinary:     Comments: Deferred  Skin:     General: Skin is warm and dry. Neurological:      General: No focal deficit present. Mental Status: She is alert. ED Medications  Medications   sodium chloride 0.9 % bolus 1,000 mL (0 mL Intravenous Stopped 11/6/23 0307)   ondansetron (ZOFRAN) injection 4 mg (4 mg Intravenous Given 11/6/23 0036)   ketorolac (TORADOL) injection 15 mg (15 mg Intravenous Given 11/6/23 0036)   iohexol (OMNIPAQUE) 350 MG/ML injection (MULTI-DOSE) 85 mL (85 mL Intravenous Given 11/6/23 0015)       Diagnostic Studies  Results Reviewed       Procedure Component Value Units Date/Time    Urine Microscopic [[id number]]  (Abnormal) Collected: 11/06/23 0150    Lab Status: Final result Specimen: Urine, Clean Catch Updated: 11/06/23 0210     RBC, UA None Seen /hpf      WBC, UA None Seen /hpf      Epithelial Cells Occasional /hpf      Bacteria, UA None Seen /hpf      MUCUS THREADS Innumerable     Amorphous Crystals, UA Occasional    UA w Reflex to Microscopic w Reflex to Culture [[id number]]  (Abnormal) Collected: 11/06/23 0150    Lab Status: Final result Specimen: Urine, Clean Catch Updated: 11/06/23 0205     Color, UA Light Orange     Clarity, UA Extra Turbid     Specific Gravity, UA 1.035     pH, UA 5.5     Leukocytes, UA Trace     Nitrite, UA Negative     Protein, UA 30 (1+) mg/dl      Glucose, UA Negative mg/dl      Ketones, UA Negative mg/dl      Urobilinogen, UA 2.0 mg/dl      Bilirubin, UA Negative     Occult Blood, UA Negative    POCT pregnancy, urine [[id number]]  (Normal) Resulted: 11/05/23 2357    Lab Status: Final result Updated: 11/05/23 2358     EXT Preg Test, Ur Negative     Control Valid    CBC and differential [[id number]]  (Abnormal) Collected: 11/05/23 2149    Lab Status: Final result Specimen: Blood from Arm, Left Updated: 11/05/23 2250     WBC 11.67 Thousand/uL      RBC 5.10 Million/uL      Hemoglobin 15.7 g/dL      Hematocrit 46.3 %      MCV 91 fL      MCH 30.8 pg      MCHC 33.9 g/dL      RDW 11.7 %      MPV 8.3 fL      Platelets 044 Thousands/uL      nRBC 0 /100 WBCs      Neutrophils Relative 93 %      Immat GRANS % 0 % Lymphocytes Relative 3 %      Monocytes Relative 3 %      Eosinophils Relative 1 %      Basophils Relative 0 %      Neutrophils Absolute 10.82 Thousands/µL      Immature Grans Absolute 0.04 Thousand/uL      Lymphocytes Absolute 0.40 Thousands/µL      Monocytes Absolute 0.29 Thousand/µL      Eosinophils Absolute 0.11 Thousand/µL      Basophils Absolute 0.01 Thousands/µL     Narrative: This is an appended report. These results have been appended to a previously verified report. Comprehensive metabolic panel [[id number]]  (Abnormal) Collected: 11/05/23 2149    Lab Status: Final result Specimen: Blood from Arm, Left Updated: 11/05/23 2219     Sodium 139 mmol/L      Potassium 4.6 mmol/L      Chloride 105 mmol/L      CO2 26 mmol/L      ANION GAP 8 mmol/L      BUN 14 mg/dL      Creatinine 0.85 mg/dL      Glucose 127 mg/dL      Calcium 9.3 mg/dL      AST 12 U/L      ALT 10 U/L      Alkaline Phosphatase 72 U/L      Total Protein 7.8 g/dL      Albumin 4.6 g/dL      Total Bilirubin 0.71 mg/dL      eGFR 86 ml/min/1.73sq m     Narrative:      [place] guidelines for Chronic Kidney Disease (CKD):     Stage 1 with normal or high GFR (GFR > 90 mL/min/1.73 square meters)    Stage 2 Mild CKD (GFR = 60-89 mL/min/1.73 square meters)    Stage 3A Moderate CKD (GFR = 45-59 mL/min/1.73 square meters)    Stage 3B Moderate CKD (GFR = 30-44 mL/min/1.73 square meters)    Stage 4 Severe CKD (GFR = 15-29 mL/min/1.73 square meters)    Stage 5 End Stage CKD (GFR <15 mL/min/1.73 square meters)  Note: GFR calculation is accurate only with a steady state creatinine    Lipase [[id number]]  (Normal) Collected: 11/05/23 2149    Lab Status: Final result Specimen: Blood from Arm, Left Updated: 11/05/23 2219     Lipase 18 u/L                    CT abdomen pelvis with contrast   Final Result by Cristiano Olmstead MD (11/06 0902)      No acute abnormality in the abdomen or pelvis.       Findings are consistent with the preliminary report from [facility] which was provided shortly after completion of the exam.         Workstation performed: [id number]               Procedures  Procedures      ED Course  ED Course as of 11/06/23 2008 Mon Nov 06, 2023   6830 Procedure Note: EKG  Date/Time: 11/06/23 2:18 AM   Interpreted by: Betty Dunne   Indications / Diagnosis: vomiting, abdominal pain  ECG reviewed by me, the ED Provider: yes   The EKG demonstrates:  Rhythm: sinus tachycardia  Intervals: normal intervals  Axis: normal axis  QRS/Blocks: normal QRS  ST Changes: No acute ST Changes, no STD/JACQUIE. Medical Decision Making  Patient is a 57-year-old female presenting for evaluation of vomiting. Based on history and evaluation, differential diagnosis includes but is not limited to: Viral gastroenteritis, urinary tract infection, appendicitis. Plan: CBC, CMP, lipase, urinalysis, urine pregnancy, CT abdomen pelvis, fluids, symptom control, reassessment    Labs remarkable for mild leukocytosis, likely reactionary and nonspecific, labs otherwise unremarkable. Urine without signs of infection. CT imaging without acute intra-abdominal pathology on [facility]'s read. On reassessment, patient's symptoms improved. Suspect symptoms in the setting of viral gastroenteritis. Stable for discharge with primary care follow-up. Patient given a prescription for Zofran written to her pharmacy. Patient seems understand this plan and is agreeable. All questions answered. Patient discharged home with return precautions. Amount and/or Complexity of Data Reviewed  Labs: ordered. Radiology: ordered. Risk  Prescription drug management.           Disposition  Final diagnoses:   Nausea and vomiting   Diarrhea     Time reflects when diagnosis was documented in both MDM as applicable and the Disposition within this note       Time User Action Codes Description Comment    11/6/2023  2:24 AM [place], Shawanda Peguero [R11.2] Nausea and vomiting     11/6/2023  2:24 AM Norm Beck [R19.7] Diarrhea           ED Disposition       ED Disposition   Discharge    Condition   Stable    Date/Time   Mon Nov 6, 2023  2:24 AM    Comment   [facility] discharge to home/self care. Follow-up Information       Follow up With Specialties Details Why Contact Info    Delcia Goodell, PA-C Family Medicine, Physician Assistant Go in 2 days  [address] [address] [address]  [phone]              Discharge Medication List as of 11/6/2023  2:25 AM        START taking these medications    Details   ondansetron (ZOFRAN-ODT) 4 mg disintegrating tablet Take 1 tablet (4 mg total) by mouth every 6 (six) hours as needed for nausea or vomiting for up to 6 doses, Starting Mon 11/6/2023, Normal           CONTINUE these medications which have NOT CHANGED    Details   buPROPion (WELLBUTRIN XL) 300 mg 24 hr tablet Take 1 tablet (300 mg total) by mouth every morning, Starting Thu 9/21/2023, Normal      sertraline (ZOLOFT) 100 mg tablet Take 1.5 tablets (150 mg total) by mouth daily, Starting Thu 9/21/2023, Until Wed 12/20/2023, Normal           No discharge procedures on file. PDMP Review       None             ED Provider  Attending physically available and evaluated [facility]. I managed the patient along with the ED Attending.     Electronically Signed by           Norma Perez DO  11/06/23 2008

## 2023-11-06 NOTE — ED ATTENDING ATTESTATION
11/5/2023  I, Riesa Dakins, MD, saw and evaluated the patient. I have discussed the patient with the resident/non-physician practitioner and agree with the resident's/non-physician practitioner's findings, Plan of Care, and MDM as documented in the resident's/non-physician practitioner's note, except where noted. All available labs and Radiology studies were reviewed. I was present for key portions of any procedure(s) performed by the resident/non-physician practitioner and I was immediately available to provide assistance. At this point I agree with the current assessment done in the Emergency Department. I have conducted an independent evaluation of this patient a history and physical is as follows:    ED Course         Critical Care Time  Procedures    43 yo female with hx of ibs, c/o few hours of vomiting, 4 episodes, no blood. Pt with lower abdominal cramping started earlier today, diarrhea. No fever, no urinary complaints, pmh hysterectomy. Vss, afebrile, tachy, lungs cta, rrr, abdomen soft tender mild left lower, moist mucous membranes. Labs, ivf, ct/p, toradol, zofran, urine, urine preg.

## 2023-11-06 NOTE — DISCHARGE INSTRUCTIONS
You have been evaluated in the Emergency Department today for your nausea and vomiting. Your evaluation suggests that your symptoms nonemergent and will improve on its own with rest and fluids. I wrote you a prescription for zofran. This is at your pharmacy. Please take as prescribed. Please follow up with your primary care physician within two days. Remember to drink plenty of fluids at home. Return to the Emergency Department if you experience worsening or uncontrolled pain, inability to tolerate fluids by mouth, difficulty breathing, recurrent uncontrolled vomiting, or any other concerning symptoms. Thank you for choosing for your care.

## 2023-11-08 ENCOUNTER — OFFICE VISIT (OUTPATIENT)
Dept: FAMILY MEDICINE CLINIC | Facility: CLINIC | Age: 38
End: 2023-11-08
Payer: COMMERCIAL

## 2023-11-08 VITALS
WEIGHT: 157 LBS | BODY MASS INDEX: 24.64 KG/M2 | SYSTOLIC BLOOD PRESSURE: 120 MMHG | HEIGHT: 67 IN | DIASTOLIC BLOOD PRESSURE: 72 MMHG | HEART RATE: 90 BPM | TEMPERATURE: 97.3 F | RESPIRATION RATE: 16 BRPM | OXYGEN SATURATION: 99 %

## 2023-11-08 DIAGNOSIS — K58.9 IRRITABLE BOWEL SYNDROME, UNSPECIFIED TYPE: ICD-10-CM

## 2023-11-08 DIAGNOSIS — K52.9 GASTROENTERITIS: Primary | ICD-10-CM

## 2023-11-08 PROCEDURE — 99214 OFFICE O/P EST MOD 30 MIN: CPT | Performed by: PHYSICIAN ASSISTANT

## 2023-11-08 RX ORDER — DICYCLOMINE HYDROCHLORIDE 10 MG/1
10 CAPSULE ORAL
Qty: 40 CAPSULE | Refills: 0 | Status: SHIPPED | OUTPATIENT
Start: 2023-11-08

## 2023-11-08 NOTE — LETTER
November 8, 2023     Patient: [facility]  YOB: 1985  Date of Visit: 11/8/2023      To Whom it May Concern:    [facility] is under my professional care. Elyse was seen in my office on 11/8/2023. Elyse may return to work on 11/13/2023 . If you have any questions or concerns, please don't hesitate to call.          Sincerely,          Elieser Antoine PA-C        CC: No Recipients

## 2023-11-08 NOTE — PROGRESS NOTES
Assessment/Plan:    Viral gastroenteritis - BRAT diet, fluids, reassurance, reviewed ER notes with patient at length    2. IBS - bentyl prn    F/u as needed    Subjective:   Chief Complaint   Patient presents with    Follow-up     Seen in the ER for nausea and vomiting  Feeling tired now. Still with abdominal pain but no nausea or vomiting       Patient ID: Hanna Obrien is a 42 y.o. female. After tea time on 11/5/2023  came home and felt very nauseated, vomiting. At first thought was IBS but usually doesn't get vomiting with IBS. Went to ER. Vomiting stopped 11/6/2023. Was discharged with stomach virus diagnosis. Still with pain in lower abdomen, fine with laying flat, worse with movement. Diarrhea stopped Tuesday. Denies fever, chills. BRAT diet, hydrating. Mother with similar symptoms but resolved in 3 days.         The following portions of the patient's history were reviewed and updated as appropriate: allergies, current medications, past family history, past medical history, past social history, past surgical history, and problem list.    Past Medical History:   Diagnosis Date    Anxiety     Concussion     recent trauma, improving s/s    Depression     Fibromyalgia      Past Surgical History:   Procedure Laterality Date    HYSTERECTOMY  02/21/2020    TUBAL LIGATION       Family History   Problem Relation Age of Onset    Depression Father     Alcohol abuse Neg Hx     Substance Abuse Neg Hx      Social History     Socioeconomic History    Marital status: /Civil Union     Spouse name: Not on file    Number of children: Not on file    Years of education: Not on file    Highest education level: Not on file   Occupational History    Not on file   Tobacco Use    Smoking status: Never    Smokeless tobacco: Never   Vaping Use    Vaping Use: Never used   Substance and Sexual Activity    Alcohol use: Yes     Comment: social    Drug use: No    Sexual activity: Yes     Partners: Male   Other Topics Concern Not on file   Social History Narrative    Not on file     Social Determinants of Health     Financial Resource Strain: Not on file   Food Insecurity: Not on file   Transportation Needs: Not on file   Physical Activity: Not on file   Stress: Not on file   Social Connections: Not on file   Intimate Partner Violence: Not on file   Housing Stability: Not on file       Current Outpatient Medications:     buPROPion (WELLBUTRIN XL) 300 mg 24 hr tablet, Take 1 tablet (300 mg total) by mouth every morning, Disp: 90 tablet, Rfl: 1    sertraline (ZOLOFT) 100 mg tablet, Take 1.5 tablets (150 mg total) by mouth daily, Disp: 135 tablet, Rfl: 1    ondansetron (ZOFRAN-ODT) 4 mg disintegrating tablet, Take 1 tablet (4 mg total) by mouth every 6 (six) hours as needed for nausea or vomiting for up to 6 doses (Patient not taking: Reported on 11/8/2023), Disp: 6 tablet, Rfl: 0    Review of Systems          Objective:    Vitals:    11/08/23 1245   BP: 120/72   Pulse: 90   Resp: 16   Temp: (!) 97.3 °F (36.3 °C)   TempSrc: Temporal   SpO2: 99%   Weight: 71.2 kg (157 lb)   Height: 5' 7" (1.702 m)        Physical Exam  Constitutional:       Appearance: Normal appearance. She is well-developed and normal weight. HENT:      Head: Normocephalic and atraumatic. Neck:      Vascular: No carotid bruit. Cardiovascular:      Rate and Rhythm: Normal rate and regular rhythm. Pulses: Normal pulses. Heart sounds: Normal heart sounds. Pulmonary:      Effort: Pulmonary effort is normal.      Breath sounds: Normal breath sounds. Abdominal:      General: Abdomen is flat. Bowel sounds are normal. There is no distension. Palpations: Abdomen is soft. There is no mass. Tenderness: There is abdominal tenderness. There is no guarding or rebound. Hernia: No hernia is present. Musculoskeletal:         General: Normal range of motion. Cervical back: Normal range of motion and neck supple. Right lower leg: No edema. Left lower leg: No edema. Skin:     General: Skin is warm. Neurological:      General: No focal deficit present. Mental Status: She is alert and oriented to person, place, and time. Psychiatric:         Mood and Affect: Mood normal.         Behavior: Behavior normal.         Thought Content:  Thought content normal.         Judgment: Judgment normal.

## 2023-12-03 DIAGNOSIS — F41.1 GENERALIZED ANXIETY DISORDER: ICD-10-CM

## 2023-12-03 DIAGNOSIS — K52.9 GASTROENTERITIS: ICD-10-CM

## 2023-12-03 DIAGNOSIS — F33.9 DEPRESSION, RECURRENT (HCC): ICD-10-CM

## 2023-12-04 RX ORDER — BUPROPION HYDROCHLORIDE 300 MG/1
300 TABLET ORAL EVERY MORNING
Qty: 90 TABLET | Refills: 0 | Status: SHIPPED | OUTPATIENT
Start: 2023-12-04

## 2023-12-04 RX ORDER — DICYCLOMINE HYDROCHLORIDE 10 MG/1
10 CAPSULE ORAL
Qty: 40 CAPSULE | Refills: 0 | Status: SHIPPED | OUTPATIENT
Start: 2023-12-04

## 2023-12-21 ENCOUNTER — OFFICE VISIT (OUTPATIENT)
Dept: FAMILY MEDICINE CLINIC | Facility: CLINIC | Age: 38
End: 2023-12-21
Payer: COMMERCIAL

## 2023-12-21 VITALS
WEIGHT: 156.7 LBS | DIASTOLIC BLOOD PRESSURE: 80 MMHG | SYSTOLIC BLOOD PRESSURE: 122 MMHG | RESPIRATION RATE: 16 BRPM | BODY MASS INDEX: 23.75 KG/M2 | HEART RATE: 105 BPM | HEIGHT: 68 IN

## 2023-12-21 DIAGNOSIS — K52.9 GASTROENTERITIS: ICD-10-CM

## 2023-12-21 DIAGNOSIS — Z00.00 ANNUAL PHYSICAL EXAM: Primary | ICD-10-CM

## 2023-12-21 DIAGNOSIS — Z12.31 ENCOUNTER FOR SCREENING MAMMOGRAM FOR MALIGNANT NEOPLASM OF BREAST: ICD-10-CM

## 2023-12-21 DIAGNOSIS — Z23 ENCOUNTER FOR IMMUNIZATION: ICD-10-CM

## 2023-12-21 PROCEDURE — 99396 PREV VISIT EST AGE 40-64: CPT | Performed by: PHYSICIAN ASSISTANT

## 2023-12-21 PROCEDURE — 90715 TDAP VACCINE 7 YRS/> IM: CPT

## 2023-12-21 PROCEDURE — 90471 IMMUNIZATION ADMIN: CPT

## 2023-12-21 RX ORDER — DICYCLOMINE HYDROCHLORIDE 10 MG/1
10 CAPSULE ORAL
Qty: 120 CAPSULE | Refills: 1 | Status: SHIPPED | OUTPATIENT
Start: 2023-12-21 | End: 2024-01-20

## 2023-12-21 NOTE — PROGRESS NOTES
ADULT ANNUAL PHYSICAL  [facility]    NAME: Elyse Fisher  AGE: 38 y.o. SEX: female  : 1985     DATE: 2023     Assessment and Plan:     Healthy 38 year old female    Immunizations and preventive care screenings were discussed with patient today. Appropriate education was printed on patient's after visit summary.    Counseling:  Alcohol/drug use: discussed moderation in alcohol intake, the recommendations for healthy alcohol use, and avoidance of illicit drug use.  Dental Health: discussed importance of regular tooth brushing, flossing, and dental visits.  Injury prevention: discussed safety/seat belts, safety helmets, smoke detectors, carbon dioxide detectors, and smoking near bedding or upholstery.  Sexual health: discussed sexually transmitted diseases, partner selection, use of condoms, avoidance of unintended pregnancy, and contraceptive alternatives.  Exercise: the importance of regular exercise/physical activity was discussed. Recommend exercise 3-5 times per week for at least 30 minutes.   Tdap today  Will return for PPD       Return in 1 year (on 2024).     Chief Complaint:     Chief Complaint   Patient presents with    Physical Exam      History of Present Illness:     Adult Annual Physical   Patient here for a comprehensive physical exam. The patient reports no problems.    Diet and Physical Activity  Diet/Nutrition: well balanced diet.   Exercise: no formal exercise.      Depression Screening  PHQ-2/9 Depression Screening    Little interest or pleasure in doing things: 0 - not at all  Feeling down, depressed, or hopeless: 0 - not at all  Trouble falling or staying asleep, or sleeping too much: 2 - more than half the days  Feeling tired or having little energy: 0 - not at all  Poor appetite or overeatin - more than half the days  Feeling bad about yourself - or that you are a failure or have let  yourself or your family down: 0 - not at all  Trouble concentrating on things, such as reading the newspaper or watching television: 0 - not at all  Moving or speaking so slowly that other people could have noticed. Or the opposite - being so fidgety or restless that you have been moving around a lot more than usual: 0 - not at all  Thoughts that you would be better off dead, or of hurting yourself in some way: 0 - not at all  PHQ-9 Score: 4   PHQ-9 Interpretation: No or Minimal depression          General Health  Sleep: sleeps well.   Hearing: normal - bilateral.  Vision: goes for regular eye exams.   Dental: regular dental visits.       /GYN Health  Follows with gynecology? yes   Hysterectomy, no need for gyn  Mammo - ordered       Review of Systems:     Review of Systems   Constitutional: Negative.    HENT: Negative.     Eyes: Negative.    Respiratory: Negative.     Cardiovascular: Negative.    Gastrointestinal: Negative.    Endocrine: Negative.    Genitourinary: Negative.    Musculoskeletal: Negative.    Skin: Negative.    Allergic/Immunologic: Negative.    Neurological: Negative.    Hematological: Negative.    Psychiatric/Behavioral: Negative.        Past Medical History:     Past Medical History:   Diagnosis Date    Anxiety     Concussion     recent trauma, improving s/s    Depression     Fibromyalgia       Past Surgical History:     Past Surgical History:   Procedure Laterality Date    HYSTERECTOMY  02/21/2020    TUBAL LIGATION        Social History:     Social History     Socioeconomic History    Marital status: /Civil Union     Spouse name: None    Number of children: None    Years of education: None    Highest education level: None   Occupational History    None   Tobacco Use    Smoking status: Never    Smokeless tobacco: Never   Vaping Use    Vaping status: Never Used   Substance and Sexual Activity    Alcohol use: Yes     Comment: social    Drug use: No    Sexual activity: Yes     Partners: Male  "  Other Topics Concern    None   Social History Narrative    None     Social Determinants of Health     Financial Resource Strain: Not on file   Food Insecurity: Not on file   Transportation Needs: Not on file   Physical Activity: Not on file   Stress: Not on file   Social Connections: Not on file   Intimate Partner Violence: Not on file   Housing Stability: Not on file      Family History:     Family History   Problem Relation Age of Onset    Depression Father     Alcohol abuse Neg Hx     Substance Abuse Neg Hx       Current Medications:     Current Outpatient Medications   Medication Sig Dispense Refill    buPROPion (WELLBUTRIN XL) 300 mg 24 hr tablet Take 1 tablet (300 mg total) by mouth every morning 90 tablet 0    sertraline (ZOLOFT) 100 mg tablet Take 1.5 tablets (150 mg total) by mouth daily 135 tablet 1    dicyclomine (BENTYL) 10 mg capsule Take 1 capsule (10 mg total) by mouth 4 (four) times a day (before meals and at bedtime) (Patient not taking: Reported on 12/21/2023) 40 capsule 0    ondansetron (ZOFRAN-ODT) 4 mg disintegrating tablet Take 1 tablet (4 mg total) by mouth every 6 (six) hours as needed for nausea or vomiting for up to 6 doses (Patient not taking: Reported on 11/8/2023) 6 tablet 0     No current facility-administered medications for this visit.      Allergies:     Allergies   Allergen Reactions    Bee Venom Hives    Amoxicillin Hives and Itching    Chlorine Itching    Pollen Extract Sneezing     Seasonal      Physical Exam:     /80   Pulse 105   Resp 16   Ht 5' 7.5\" (1.715 m)   Wt 71.1 kg (156 lb 11.2 oz)   LMP 09/25/2018 (Exact Date)   BMI 24.18 kg/m²     Physical Exam  Constitutional:       Appearance: Normal appearance. She is well-developed and normal weight.   HENT:      Head: Normocephalic and atraumatic.      Right Ear: Hearing normal.      Left Ear: Hearing normal.      Mouth/Throat:      Pharynx: Uvula midline.   Eyes:      Extraocular Movements: Extraocular movements " intact.      Conjunctiva/sclera: Conjunctivae normal.      Pupils: Pupils are equal, round, and reactive to light.   Neck:      Thyroid: No thyromegaly.   Cardiovascular:      Rate and Rhythm: Normal rate and regular rhythm.      Pulses: Normal pulses.      Heart sounds: Normal heart sounds. No murmur heard.  Pulmonary:      Effort: Pulmonary effort is normal.      Breath sounds: Normal breath sounds.   Abdominal:      General: Abdomen is flat. Bowel sounds are normal. There is no distension.      Palpations: Abdomen is soft. There is no mass.      Tenderness: There is no abdominal tenderness.   Musculoskeletal:         General: Normal range of motion.      Cervical back: Normal range of motion and neck supple.   Lymphadenopathy:      Cervical: No cervical adenopathy.   Skin:     General: Skin is warm.   Neurological:      General: No focal deficit present.      Mental Status: She is alert and oriented to person, place, and time.      Cranial Nerves: No cranial nerve deficit.      Deep Tendon Reflexes: Reflexes normal.   Psychiatric:         Mood and Affect: Mood normal.         Behavior: Behavior normal.         Thought Content: Thought content normal.         Judgment: Judgment normal.          Kalee Harp PA-C  [facility]

## 2023-12-26 ENCOUNTER — CLINICAL SUPPORT (OUTPATIENT)
Dept: FAMILY MEDICINE CLINIC | Facility: CLINIC | Age: 38
End: 2023-12-26
Payer: COMMERCIAL

## 2023-12-26 DIAGNOSIS — Z11.1 SCREENING FOR TUBERCULOSIS: Primary | ICD-10-CM

## 2023-12-26 PROCEDURE — 86580 TB INTRADERMAL TEST: CPT

## 2023-12-28 ENCOUNTER — IMMUNIZATIONS (OUTPATIENT)
Dept: FAMILY MEDICINE CLINIC | Facility: CLINIC | Age: 38
End: 2023-12-28

## 2023-12-28 DIAGNOSIS — Z11.1 ENCOUNTER FOR PPD SKIN TEST READING: Primary | ICD-10-CM

## 2023-12-28 LAB
INDURATION: 0 MM
TB SKIN TEST: NEGATIVE

## 2024-02-28 DIAGNOSIS — F41.1 GENERALIZED ANXIETY DISORDER: ICD-10-CM

## 2024-02-29 RX ORDER — SERTRALINE HYDROCHLORIDE 100 MG/1
150 TABLET, FILM COATED ORAL DAILY
Qty: 135 TABLET | Refills: 0 | Status: SHIPPED | OUTPATIENT
Start: 2024-02-29 | End: 2024-05-29

## 2024-03-05 ENCOUNTER — OFFICE VISIT (OUTPATIENT)
Dept: FAMILY MEDICINE CLINIC | Facility: CLINIC | Age: 39
End: 2024-03-05
Payer: COMMERCIAL

## 2024-03-05 VITALS
DIASTOLIC BLOOD PRESSURE: 84 MMHG | HEIGHT: 68 IN | WEIGHT: 154 LBS | RESPIRATION RATE: 17 BRPM | SYSTOLIC BLOOD PRESSURE: 120 MMHG | HEART RATE: 95 BPM | BODY MASS INDEX: 23.34 KG/M2 | OXYGEN SATURATION: 97 % | TEMPERATURE: 98.7 F

## 2024-03-05 DIAGNOSIS — R52 BODY ACHES: ICD-10-CM

## 2024-03-05 DIAGNOSIS — J02.9 SORE THROAT: Primary | ICD-10-CM

## 2024-03-05 LAB
S PYO DNA THROAT QL NAA+PROBE: NOT DETECTED
SL AMB POCT RAPID FLU A: NEGATIVE
SL AMB POCT RAPID FLU B: NEGATIVE

## 2024-03-05 PROCEDURE — 87651 STREP A DNA AMP PROBE: CPT | Performed by: PHYSICIAN ASSISTANT

## 2024-03-05 PROCEDURE — 87804 INFLUENZA ASSAY W/OPTIC: CPT | Performed by: PHYSICIAN ASSISTANT

## 2024-03-05 PROCEDURE — 99213 OFFICE O/P EST LOW 20 MIN: CPT | Performed by: PHYSICIAN ASSISTANT

## 2024-03-05 PROCEDURE — 87070 CULTURE OTHR SPECIMN AEROBIC: CPT | Performed by: PHYSICIAN ASSISTANT

## 2024-03-05 NOTE — PROGRESS NOTES
Assessment/Plan:    Viral Uri - strep/flu cultures negative today, covid negative x 2 at home, fluids, rest, OTC for symptomatic relief,  reassurance    F/u as needed    Subjective:   Chief Complaint   Patient presents with    Laryngitis     3/1/24 started all other symptoms    Cough    Sore Throat     Started 2/28/24    Nasal Congestion    Headache    Generalized Body Aches     Negative COVID test X2 last week      Patient ID: Elyse Fisher is a 38 y.o. female.    Patient here started feeling sick Wednesday into Thursday. Brother was sick with covid beginning of last week. She checked a covid test Wed/Sat both negative. Having sore throat, stuffy nose with yellow mucus, productive coughing yellow sputum that makes chest hurt, headaches off and on. Temp normal. Denies n/v/d. Nauseated. Tried daytime and night time flu medication.        The following portions of the patient's history were reviewed and updated as appropriate: allergies, current medications, past family history, past medical history, past social history, past surgical history, and problem list.    Past Medical History:   Diagnosis Date    Anxiety     Concussion     recent trauma, improving s/s    Depression     Fibromyalgia     Scoliosis      Past Surgical History:   Procedure Laterality Date    HYSTERECTOMY  02/21/2020    TUBAL LIGATION       Family History   Problem Relation Age of Onset    Depression Father     Alcohol abuse Neg Hx     Substance Abuse Neg Hx      Social History     Socioeconomic History    Marital status: /Civil Union     Spouse name: Not on file    Number of children: Not on file    Years of education: Not on file    Highest education level: Not on file   Occupational History    Not on file   Tobacco Use    Smoking status: Never    Smokeless tobacco: Never   Vaping Use    Vaping status: Never Used   Substance and Sexual Activity    Alcohol use: Yes     Alcohol/week: 2.0 standard drinks of alcohol     Types: 1 Glasses of  "wine, 1 Standard drinks or equivalent per week     Comment: social    Drug use: No    Sexual activity: Yes     Partners: Male   Other Topics Concern    Not on file   Social History Narrative    Not on file     Social Determinants of Health     Financial Resource Strain: Not on file   Food Insecurity: Not on file   Transportation Needs: Not on file   Physical Activity: Not on file   Stress: Not on file   Social Connections: Not on file   Intimate Partner Violence: Not on file   Housing Stability: Not on file       Current Outpatient Medications:     buPROPion (WELLBUTRIN XL) 300 mg 24 hr tablet, Take 1 tablet (300 mg total) by mouth every morning, Disp: 90 tablet, Rfl: 0    sertraline (ZOLOFT) 100 mg tablet, Take 1.5 tablets (150 mg total) by mouth daily, Disp: 135 tablet, Rfl: 0    dicyclomine (BENTYL) 10 mg capsule, Take 1 capsule (10 mg total) by mouth 4 (four) times a day (before meals and at bedtime), Disp: 120 capsule, Rfl: 1    Review of Systems          Objective:    Vitals:    03/05/24 0937   BP: 120/84   Pulse: 95   Resp: 17   Temp: 98.7 °F (37.1 °C)   SpO2: 97%   Weight: 69.9 kg (154 lb)   Height: 5' 7.5\" (1.715 m)        Physical Exam  Constitutional:       General: She is not in acute distress.     Appearance: Normal appearance. She is well-developed. She is not toxic-appearing.   HENT:      Head: Normocephalic and atraumatic.      Right Ear: Tympanic membrane, ear canal and external ear normal.      Left Ear: Tympanic membrane, ear canal and external ear normal.      Nose: Mucosal edema, congestion and rhinorrhea present.      Mouth/Throat:      Mouth: Mucous membranes are moist.      Pharynx: Oropharynx is clear. No oropharyngeal exudate or posterior oropharyngeal erythema.   Eyes:      Extraocular Movements: Extraocular movements intact.      Conjunctiva/sclera: Conjunctivae normal.   Cardiovascular:      Rate and Rhythm: Normal rate and regular rhythm.      Pulses: Normal pulses.      Heart sounds: " Normal heart sounds.   Pulmonary:      Effort: Pulmonary effort is normal.      Breath sounds: Normal breath sounds.   Musculoskeletal:         General: Normal range of motion.      Cervical back: Normal range of motion and neck supple. No rigidity or tenderness.   Lymphadenopathy:      Cervical: No cervical adenopathy.   Skin:     General: Skin is warm.   Neurological:      General: No focal deficit present.      Mental Status: She is alert and oriented to person, place, and time.   Psychiatric:         Mood and Affect: Mood normal.         Behavior: Behavior normal.         Thought Content: Thought content normal.         Judgment: Judgment normal.

## 2024-03-07 LAB — BACTERIA THROAT CULT: NORMAL

## 2024-03-26 DIAGNOSIS — F33.9 DEPRESSION, RECURRENT (HCC): ICD-10-CM

## 2024-03-26 DIAGNOSIS — F41.1 GENERALIZED ANXIETY DISORDER: ICD-10-CM

## 2024-03-26 RX ORDER — BUPROPION HYDROCHLORIDE 300 MG/1
300 TABLET ORAL EVERY MORNING
Qty: 90 TABLET | Refills: 0 | Status: SHIPPED | OUTPATIENT
Start: 2024-03-26

## 2024-06-12 ENCOUNTER — OFFICE VISIT (OUTPATIENT)
Dept: URGENT CARE | Facility: CLINIC | Age: 39
End: 2024-06-12
Payer: COMMERCIAL

## 2024-06-12 VITALS
BODY MASS INDEX: 24.59 KG/M2 | OXYGEN SATURATION: 98 % | HEART RATE: 100 BPM | WEIGHT: 166 LBS | SYSTOLIC BLOOD PRESSURE: 120 MMHG | DIASTOLIC BLOOD PRESSURE: 82 MMHG | TEMPERATURE: 98.9 F | HEIGHT: 69 IN | RESPIRATION RATE: 18 BRPM

## 2024-06-12 DIAGNOSIS — H10.9 CONJUNCTIVITIS OF BOTH EYES, UNSPECIFIED CONJUNCTIVITIS TYPE: Primary | ICD-10-CM

## 2024-06-12 PROCEDURE — G0382 LEV 3 HOSP TYPE B ED VISIT: HCPCS | Performed by: PHYSICIAN ASSISTANT

## 2024-06-12 RX ORDER — TOBRAMYCIN 3 MG/ML
1 SOLUTION/ DROPS OPHTHALMIC
Qty: 5 ML | Refills: 0 | Status: SHIPPED | OUTPATIENT
Start: 2024-06-12

## 2024-06-12 NOTE — LETTER
June 12, 2024     Patient: Elyse Fisher  YOB: 1985  Date of Visit: 6/12/2024      To Whom it May Concern:    Elyse Fisher is under my professional care. Elyse was seen in my office on 6/12/2024. Elyse may return to work on 6/14 .    If you have any questions or concerns, please don't hesitate to call.         Sincerely,          BE CORNELIUS HUNTER        CC: No Recipients

## 2024-06-12 NOTE — PROGRESS NOTES
[facility]      NAME: Elyse Fisher is a 38 y.o. female  : 1985    MRN: [id number]  DATE: 2024  TIME: 12:13 PM    Assessment and Plan   Conjunctivitis of both eyes, unspecified conjunctivitis type [H10.9]  1. Conjunctivitis of both eyes, unspecified conjunctivitis type  tobramycin (TOBREX) 0.3 % SOLN          Patient Instructions   To take drops as prescribed.   Wash your hands with soap and water often.  Wash your hands before and after you touch your eyes. Also wash your hands before you prepare or eat food and after you use the bathroom or change a diaper.    Avoid allergens.  Try to avoid the things that cause your allergies, such as pets, dust, or grass.     Avoid contact with others.  Do not share towels or washcloths. Try to stay away from others as much as possible.   Follow up with PCP in 3-5 days.   Risks and benefits discussed. Patient understands and agrees with the plan.      If tests have been performed at [facility], our office will contact you with results if changes need to be made to the care plan discussed with you at the visit.  You can review your full results on [facility]'s MyChart.     Follow up with PCP in 3-5 days.      If any of the following occur, please report to your nearest ED for evaluation or call 911.   Difficultly breathing or shortness of breath  Chest pain  Acutely worsening symptoms.   To present to the ER if symptoms worsen.  Chief Complaint     Chief Complaint   Patient presents with    Eye Problem     Started on  with drainage and redness, pain worse at night         History of Present Illness   Elyse Fisher presents to the clinic c/o    Conjunctivitis   The current episode started 5 to 7 days ago. The onset is undetermined. The problem has been gradually worsening. The problem is moderate. Nothing relieves the symptoms. Associated symptoms include eye itching, eye discharge and eye redness. Pertinent negatives include no fever, no photophobia,  no abdominal pain, no congestion, no ear discharge, no ear pain, no headaches, no cough, no wheezing, no rash and no eye pain.       Review of Systems   Review of Systems   Constitutional:  Negative for chills, diaphoresis, fatigue and fever.   HENT:  Negative for congestion, ear discharge, ear pain and facial swelling.    Eyes:  Positive for discharge, redness and itching. Negative for photophobia, pain and visual disturbance.   Respiratory:  Negative for apnea, cough, chest tightness, shortness of breath and wheezing.    Cardiovascular:  Negative for chest pain and palpitations.   Gastrointestinal:  Negative for abdominal pain.   Skin:  Negative for color change, rash and wound.   Neurological:  Negative for dizziness and headaches.   Hematological:  Negative for adenopathy.         Current Medications     Long-Term Medications   Medication Sig Dispense Refill    buPROPion (WELLBUTRIN XL) 300 mg 24 hr tablet Take 1 tablet (300 mg total) by mouth every morning 90 tablet 0    dicyclomine (BENTYL) 10 mg capsule Take 1 capsule (10 mg total) by mouth 4 (four) times a day (before meals and at bedtime) 120 capsule 1    sertraline (ZOLOFT) 100 mg tablet Take 1.5 tablets (150 mg total) by mouth daily 135 tablet 0       Current Allergies     Allergies as of 06/12/2024 - Reviewed 06/12/2024   Allergen Reaction Noted    Bee venom Hives 02/21/2020    Amoxicillin Hives and Itching 01/02/2020    Chlorine Itching 02/14/2020    Pollen extract Sneezing 02/21/2020            The following portions of the patient's history were reviewed and updated as appropriate: allergies, current medications, past family history, past medical history, past social history, past surgical history and problem list.  Past Medical History:   Diagnosis Date    Anxiety     Concussion     recent trauma, improving s/s    Depression     Fibromyalgia     Scoliosis      Past Surgical History:   Procedure Laterality Date    HYSTERECTOMY  02/21/2020    TUBAL  "LIGATION       Social History     Socioeconomic History    Marital status: /Civil Union     Spouse name: Not on file    Number of children: Not on file    Years of education: Not on file    Highest education level: Not on file   Occupational History    Not on file   Tobacco Use    Smoking status: Never    Smokeless tobacco: Never   Vaping Use    Vaping status: Never Used   Substance and Sexual Activity    Alcohol use: Yes     Alcohol/week: 2.0 standard drinks of alcohol     Types: 1 Glasses of wine, 1 Standard drinks or equivalent per week     Comment: social    Drug use: No    Sexual activity: Yes     Partners: Male   Other Topics Concern    Not on file   Social History Narrative    Not on file     Social Determinants of Health     Financial Resource Strain: Not on file   Food Insecurity: Not on file   Transportation Needs: Not on file   Physical Activity: Not on file   Stress: Not on file   Social Connections: Not on file   Intimate Partner Violence: Not on file   Housing Stability: Not on file       Objective   /82 (BP Location: Left arm, Patient Position: Sitting)   Pulse 100   Temp 98.9 °F (37.2 °C) (Tympanic)   Resp 18   Ht 5' 9\" (1.753 m)   Wt 75.3 kg (166 lb)   LMP 09/25/2018 (Exact Date)   SpO2 98%   BMI 24.51 kg/m²      Physical Exam     Physical Exam  Vitals and nursing note reviewed.   Constitutional:       General: She is not in acute distress.     Appearance: She is well-developed. She is not diaphoretic.   HENT:      Head: Normocephalic and atraumatic.      Right Ear: Tympanic membrane and external ear normal.      Left Ear: Tympanic membrane and external ear normal.      Nose: Nose normal.      Mouth/Throat:      Mouth: Mucous membranes are moist.      Pharynx: No oropharyngeal exudate or posterior oropharyngeal erythema.   Eyes:      General: No scleral icterus.        Right eye: Discharge present.         Left eye: Discharge present.     Conjunctiva/sclera:      Right eye: " Right conjunctiva is injected.      Left eye: Left conjunctiva is injected.   Cardiovascular:      Rate and Rhythm: Normal rate and regular rhythm.      Heart sounds: Normal heart sounds. No murmur heard.     No friction rub. No gallop.   Pulmonary:      Effort: Pulmonary effort is normal. No respiratory distress.      Breath sounds: Normal breath sounds. No decreased breath sounds, wheezing, rhonchi or rales.   Skin:     General: Skin is warm and dry.      Coloration: Skin is not pale.      Findings: No erythema or rash.   Neurological:      Mental Status: She is alert and oriented to person, place, and time.   Psychiatric:         Behavior: Behavior normal.         Thought Content: Thought content normal.         Judgment: Judgment normal.         Juliane Mcneil PA-C

## 2024-07-16 DIAGNOSIS — F33.9 DEPRESSION, RECURRENT (HCC): ICD-10-CM

## 2024-07-16 DIAGNOSIS — F41.1 GENERALIZED ANXIETY DISORDER: ICD-10-CM

## 2024-07-17 RX ORDER — SERTRALINE HYDROCHLORIDE 100 MG/1
150 TABLET, FILM COATED ORAL DAILY
Qty: 150 TABLET | Refills: 1 | Status: SHIPPED | OUTPATIENT
Start: 2024-07-17 | End: 2025-02-02

## 2024-07-17 RX ORDER — BUPROPION HYDROCHLORIDE 300 MG/1
300 TABLET ORAL EVERY MORNING
Qty: 100 TABLET | Refills: 1 | Status: SHIPPED | OUTPATIENT
Start: 2024-07-17

## 2024-10-22 DIAGNOSIS — F41.1 GENERALIZED ANXIETY DISORDER: ICD-10-CM

## 2024-10-22 DIAGNOSIS — F33.9 DEPRESSION, RECURRENT (HCC): ICD-10-CM

## 2024-10-23 RX ORDER — BUPROPION HYDROCHLORIDE 300 MG/1
300 TABLET ORAL EVERY MORNING
Qty: 100 TABLET | Refills: 0 | Status: SHIPPED | OUTPATIENT
Start: 2024-10-23

## 2024-12-23 DIAGNOSIS — F41.1 GENERALIZED ANXIETY DISORDER: ICD-10-CM

## 2024-12-24 RX ORDER — SERTRALINE HYDROCHLORIDE 100 MG/1
150 TABLET, FILM COATED ORAL DAILY
Qty: 135 TABLET | Refills: 0 | Status: SHIPPED | OUTPATIENT
Start: 2024-12-24 | End: 2025-07-12

## 2025-01-27 DIAGNOSIS — F41.1 GENERALIZED ANXIETY DISORDER: ICD-10-CM

## 2025-01-27 DIAGNOSIS — F33.9 DEPRESSION, RECURRENT (HCC): ICD-10-CM

## 2025-01-28 RX ORDER — BUPROPION HYDROCHLORIDE 300 MG/1
300 TABLET ORAL EVERY MORNING
Qty: 100 TABLET | Refills: 1 | Status: SHIPPED | OUTPATIENT
Start: 2025-01-28

## 2025-04-30 ENCOUNTER — OFFICE VISIT (OUTPATIENT)
Dept: FAMILY MEDICINE CLINIC | Facility: CLINIC | Age: 40
End: 2025-04-30
Payer: COMMERCIAL

## 2025-04-30 VITALS
OXYGEN SATURATION: 98 % | HEART RATE: 104 BPM | RESPIRATION RATE: 16 BRPM | WEIGHT: 171 LBS | BODY MASS INDEX: 25.33 KG/M2 | DIASTOLIC BLOOD PRESSURE: 80 MMHG | HEIGHT: 69 IN | SYSTOLIC BLOOD PRESSURE: 122 MMHG | TEMPERATURE: 98.2 F

## 2025-04-30 DIAGNOSIS — F32.2 SEVERE MAJOR DEPRESSIVE DISORDER (HCC): ICD-10-CM

## 2025-04-30 DIAGNOSIS — F41.1 GENERALIZED ANXIETY DISORDER: ICD-10-CM

## 2025-04-30 DIAGNOSIS — Z00.00 ANNUAL PHYSICAL EXAM: ICD-10-CM

## 2025-04-30 DIAGNOSIS — F33.9 DEPRESSION, RECURRENT (HCC): Primary | ICD-10-CM

## 2025-04-30 PROCEDURE — 99213 OFFICE O/P EST LOW 20 MIN: CPT | Performed by: PHYSICIAN ASSISTANT

## 2025-04-30 RX ORDER — SERTRALINE HYDROCHLORIDE 100 MG/1
150 TABLET, FILM COATED ORAL DAILY
Qty: 135 TABLET | Refills: 3 | Status: SHIPPED | OUTPATIENT
Start: 2025-04-30 | End: 2025-11-16

## 2025-04-30 RX ORDER — BUPROPION HYDROCHLORIDE 150 MG/1
150 TABLET ORAL EVERY MORNING
Qty: 90 TABLET | Refills: 3 | Status: SHIPPED | OUTPATIENT
Start: 2025-04-30 | End: 2026-04-25

## 2025-04-30 NOTE — ASSESSMENT & PLAN NOTE
Orders:    buPROPion (WELLBUTRIN XL) 150 mg 24 hr tablet; Take 1 tablet (150 mg total) by mouth every morning    sertraline (ZOLOFT) 100 mg tablet; Take 1.5 tablets (150 mg total) by mouth daily    
Depression Screening Follow-up Plan: Patient's depression screening was positive with a PHQ-9 score of 5. Patient with underlying depression and was advised to continue current medications as prescribed.       f/u physical and med adjustment in June  f/u as needed  
Depression Screening Follow-up Plan: Patient's depression screening was positive with a PHQ-9 score of 5. Patient with underlying depression and was advised to continue current medications as prescribed.  Will continue zoloft 150 mg and add to wellbutrin for a total dose of 450 mg. Would like to get patient through end of school year and then in June can consider changing medications while on break. Possibly switching Zoloft to Vybriid or Effexor?  Orders:    buPROPion (WELLBUTRIN XL) 150 mg 24 hr tablet; Take 1 tablet (150 mg total) by mouth every morning    
no

## 2025-04-30 NOTE — PROGRESS NOTES
"Name: Elyse Fisher      : 1985      MRN: [id number]  Encounter Provider: Kalee Harp PA-C  Encounter Date: 2025   Encounter department: [facility]  :  Assessment & Plan  Depression, recurrent (HCC)  Depression Screening Follow-up Plan: Patient's depression screening was positive with a PHQ-9 score of 5. Patient with underlying depression and was advised to continue current medications as prescribed.  Will continue zoloft 150 mg and add to wellbutrin for a total dose of 450 mg. Would like to get patient through end of school year and then in  can consider changing medications while on break. Possibly switching Zoloft to Vybriid or Effexor?  Orders:    buPROPion (WELLBUTRIN XL) 150 mg 24 hr tablet; Take 1 tablet (150 mg total) by mouth every morning    Generalized anxiety disorder    Orders:    buPROPion (WELLBUTRIN XL) 150 mg 24 hr tablet; Take 1 tablet (150 mg total) by mouth every morning    sertraline (ZOLOFT) 100 mg tablet; Take 1.5 tablets (150 mg total) by mouth daily    Severe major depressive disorder (HCC)  Depression Screening Follow-up Plan: Patient's depression screening was positive with a PHQ-9 score of 5. Patient with underlying depression and was advised to continue current medications as prescribed.       f/u physical and med adjustment in   f/u as needed         History of Present Illness   Patient losing her temper, getting upset more easily, increase in depression. School/work has been more stressful.    Last day of work .  [employer], has been a whole year.     Anxiety        Depression      Review of Systems   Psychiatric/Behavioral:  Positive for depression.        Objective   /80   Pulse 104   Temp 98.2 °F (36.8 °C) (Temporal)   Resp 16   Ht 5' 9\" (1.753 m)   Wt 77.6 kg (171 lb)   LMP 2018 (Exact Date)   SpO2 98%   BMI 25.25 kg/m²      Physical Exam  Constitutional:       Appearance: Normal " appearance.   HENT:      Head: Normocephalic and atraumatic.   Skin:     General: Skin is warm.   Neurological:      General: No focal deficit present.      Mental Status: She is alert and oriented to person, place, and time.   Psychiatric:         Mood and Affect: Mood normal.         Behavior: Behavior normal.         Thought Content: Thought content normal.         Judgment: Judgment normal.

## 2025-05-05 DIAGNOSIS — F33.9 DEPRESSION, RECURRENT (HCC): ICD-10-CM

## 2025-05-05 DIAGNOSIS — F41.1 GENERALIZED ANXIETY DISORDER: ICD-10-CM

## 2025-05-05 RX ORDER — BUPROPION HYDROCHLORIDE 300 MG/1
300 TABLET ORAL EVERY MORNING
Qty: 100 TABLET | Refills: 1 | Status: SHIPPED | OUTPATIENT
Start: 2025-05-05

## 2025-05-05 NOTE — TELEPHONE ENCOUNTER
Medication: buPROPion (WELLBUTRIN XL) 300 mg 24 hr tablet     Dose/Frequency:      Take 1 tablet (300 mg total) by mouth every morning                      Quantity: 100    Pharmacy: [place] [id number] - DONATO Gilman - [facility] [address]      Office:   [x] PCP/Provider -Kalee Harp PA-C    [] Speciality/Provider -     Does the patient have enough for 3 days?   [] Yes   [x] No - Send as HP to POD